# Patient Record
Sex: MALE | Race: WHITE | NOT HISPANIC OR LATINO | Employment: FULL TIME | URBAN - METROPOLITAN AREA
[De-identification: names, ages, dates, MRNs, and addresses within clinical notes are randomized per-mention and may not be internally consistent; named-entity substitution may affect disease eponyms.]

---

## 2022-05-06 ENCOUNTER — OFFICE VISIT (OUTPATIENT)
Dept: FAMILY MEDICINE | Facility: CLINIC | Age: 42
End: 2022-05-06
Payer: COMMERCIAL

## 2022-05-06 VITALS
RESPIRATION RATE: 18 BRPM | SYSTOLIC BLOOD PRESSURE: 156 MMHG | BODY MASS INDEX: 44.1 KG/M2 | WEIGHT: 315 LBS | DIASTOLIC BLOOD PRESSURE: 110 MMHG | OXYGEN SATURATION: 95 % | HEART RATE: 121 BPM | TEMPERATURE: 98.1 F | HEIGHT: 71 IN

## 2022-05-06 DIAGNOSIS — I10 HYPERTENSION GOAL BP (BLOOD PRESSURE) < 130/80: Primary | ICD-10-CM

## 2022-05-06 DIAGNOSIS — R49.0 HOARSENESS: ICD-10-CM

## 2022-05-06 DIAGNOSIS — Z13.29 SCREENING FOR THYROID DISORDER: ICD-10-CM

## 2022-05-06 DIAGNOSIS — Z71.6 ENCOUNTER FOR SMOKING CESSATION COUNSELING: ICD-10-CM

## 2022-05-06 DIAGNOSIS — R00.0 TACHYCARDIA: ICD-10-CM

## 2022-05-06 DIAGNOSIS — Z13.0 SCREENING FOR DEFICIENCY ANEMIA: ICD-10-CM

## 2022-05-06 PROBLEM — E66.9 OBESITY (BMI 30-39.9): Status: ACTIVE | Noted: 2022-05-06

## 2022-05-06 LAB
ERYTHROCYTE [DISTWIDTH] IN BLOOD BY AUTOMATED COUNT: 13.7 % (ref 10–15)
HCT VFR BLD AUTO: 40.9 % (ref 40–53)
HGB BLD-MCNC: 13.8 G/DL (ref 13.3–17.7)
MCH RBC QN AUTO: 30.4 PG (ref 26.5–33)
MCHC RBC AUTO-ENTMCNC: 33.7 G/DL (ref 31.5–36.5)
MCV RBC AUTO: 90 FL (ref 78–100)
PLATELET # BLD AUTO: 278 10E3/UL (ref 150–450)
RBC # BLD AUTO: 4.54 10E6/UL (ref 4.4–5.9)
WBC # BLD AUTO: 9 10E3/UL (ref 4–11)

## 2022-05-06 PROCEDURE — 85027 COMPLETE CBC AUTOMATED: CPT | Performed by: NURSE PRACTITIONER

## 2022-05-06 PROCEDURE — 36415 COLL VENOUS BLD VENIPUNCTURE: CPT | Performed by: NURSE PRACTITIONER

## 2022-05-06 PROCEDURE — 84443 ASSAY THYROID STIM HORMONE: CPT | Performed by: NURSE PRACTITIONER

## 2022-05-06 PROCEDURE — 82043 UR ALBUMIN QUANTITATIVE: CPT | Performed by: NURSE PRACTITIONER

## 2022-05-06 PROCEDURE — 99204 OFFICE O/P NEW MOD 45 MIN: CPT | Performed by: NURSE PRACTITIONER

## 2022-05-06 PROCEDURE — 93000 ELECTROCARDIOGRAM COMPLETE: CPT | Performed by: NURSE PRACTITIONER

## 2022-05-06 PROCEDURE — 80053 COMPREHEN METABOLIC PANEL: CPT | Performed by: NURSE PRACTITIONER

## 2022-05-06 RX ORDER — ALBUTEROL SULFATE 90 UG/1
2 AEROSOL, METERED RESPIRATORY (INHALATION) PRN
COMMUNITY
Start: 2021-09-13 | End: 2023-06-09

## 2022-05-06 RX ORDER — AMLODIPINE AND BENAZEPRIL HYDROCHLORIDE 5; 10 MG/1; MG/1
1 CAPSULE ORAL DAILY
Qty: 90 CAPSULE | Refills: 0 | Status: SHIPPED | OUTPATIENT
Start: 2022-05-06 | End: 2022-05-23

## 2022-05-06 RX ORDER — AMLODIPINE AND BENAZEPRIL HYDROCHLORIDE 5; 10 MG/1; MG/1
CAPSULE ORAL
COMMUNITY
Start: 2021-12-31 | End: 2022-05-06

## 2022-05-06 NOTE — LETTER
May 12, 2022      Julius Zuniga  [address] [place] [id number]        Dear ,    We are writing to inform you of your test results.    Your labs overall look good except for increase in microalbumin consistent with early damage to your kidneys from your elevated blood pressure.  We will keep you on your blood pressure medicine and increase if we need to. Quitting smoking will also be a huge help with this. We will discuss further at your upcoming office visit.     For additional lab test information, [email or web address] is an excellent reference.       Resulted Orders   CBC with platelets   Result Value Ref Range    WBC Count 9.0 4.0 - 11.0 10e3/uL    RBC Count 4.54 4.40 - 5.90 10e6/uL    Hemoglobin 13.8 13.3 - 17.7 g/dL    Hematocrit 40.9 40.0 - 53.0 %    MCV 90 78 - 100 fL    MCH 30.4 26.5 - 33.0 pg    MCHC 33.7 31.5 - 36.5 g/dL    RDW 13.7 10.0 - 15.0 %    Platelet Count 278 150 - 450 10e3/uL   Comprehensive metabolic panel (BMP + Alb, Alk Phos, ALT, AST, Total. Bili, TP)   Result Value Ref Range    Sodium 139 133 - 144 mmol/L    Potassium 4.2 3.4 - 5.3 mmol/L    Chloride 106 94 - 109 mmol/L    Carbon Dioxide (CO2) 30 20 - 32 mmol/L    Anion Gap 3 3 - 14 mmol/L    Urea Nitrogen 13 7 - 30 mg/dL    Creatinine 0.88 0.66 - 1.25 mg/dL    Calcium 9.2 8.5 - 10.1 mg/dL    Glucose 92 70 - 99 mg/dL    Alkaline Phosphatase 90 40 - 150 U/L    AST 20 0 - 45 U/L    ALT 38 0 - 70 U/L    Protein Total 7.4 6.8 - 8.8 g/dL    Albumin 3.8 3.4 - 5.0 g/dL    Bilirubin Total 0.2 0.2 - 1.3 mg/dL    GFR Estimate >90 >60 mL/min/1.73m2      Comment:      Effective December 21, 2021 eGFRcr in adults is calculated using the 2021 CKD-EPI creatinine equation which includes age and gender (Leelee carver al., NEJM, DOI: 10.1056/VVXMsf3507655)   Albumin Random Urine Quantitative with Creat Ratio   Result Value Ref Range    Creatinine Urine mg/dL 147 mg/dL    Albumin Urine mg/L 34 mg/L    Albumin Urine mg/g Cr 23.13 (H) 0.00 - 17.00  mg/g Cr   TSH with free T4 reflex   Result Value Ref Range    TSH 1.52 0.40 - 4.00 mU/L       If you have any questions or concerns, please call the clinic at the number listed above.       Sincerely,        LOYD Mayes-BC /

## 2022-05-06 NOTE — PROGRESS NOTES
"Assessment & Plan     Hypertension goal BP (blood pressure) < 130/80  Needs recheck of BP within 2 weeks of med restart.   Continue same medication this was refilled today. IF not good control will switch to lisinopril.   Weight loss.   Activity.  Smoking cessation.   Physical soon with fasting cholesterol.   Julius verbalizes understanding of plan of care and is in agreement.   - Comprehensive metabolic panel (BMP + Alb, Alk Phos, ALT, AST, Total. Bili, TP)  - Albumin Random Urine Quantitative with Creat Ratio  - Comprehensive metabolic panel (BMP + Alb, Alk Phos, ALT, AST, Total. Bili, TP)  - Albumin Random Urine Quantitative with Creat Ratio    BMI 45.0-49.9, adult (H)  Work on healthier eating increased activity and goal of weight loss.   Closely monitor.     Encounter for smoking cessation counseling  Chantix.  - varenicline (CHANTIX ISAAC) 0.5 MG X 11 & 1 MG X 42 tablet  Dispense: 53 tablet; Refill: 0    Hoarseness  Chronic referral to ENT.  - Otolaryngology Referral    Tachycardia  EKG sinus tachy; Labs avoid caffeine. Consider Zio monitor or Echo.  - EKG 12-lead complete w/read - Clinics    Screening for thyroid disorder    - TSH with free T4 reflex  - TSH with free T4 reflex    Screening for deficiency anemia    - CBC with platelets  - CBC with platelets    Tobacco Cessation:   reports that he has been smoking cigarettes. He started smoking about 26 years ago. He has a 26.00 pack-year smoking history. He has never used smokeless tobacco.  Tobacco Cessation Action Plan: Pharmacotherapies : Chantix    BMI:   Estimated body mass index is 49.3 kg/m  as calculated from the following:    Height as of this encounter: 1.802 m (5' 10.95\").    Weight as of this encounter: 160.1 kg (352 lb 14.4 oz).   Weight management plan: Discussed healthy diet and exercise guidelines    Return in about 2 weeks (around 5/20/2022) for nurse only BP check; wellnessl with fasting labs in 4-8 weeks.      Jennifer Walker, KRISTIN CNP  M " "[facility] [place]    Facundo Madrid is a 42 year old who presents for the following health issues     History of Present Illness       Hypertension: He presents for follow up of hypertension.  He does not check blood pressure  regularly outside of the clinic. Outside blood pressures have been over 140/90. He follows a low salt diet.     He eats 2-3 servings of fruits and vegetables daily.He consumes 1 sweetened beverage(s) daily.He exercises with enough effort to increase his heart rate 20 to 29 minutes per day.  He exercises with enough effort to increase his heart rate 4 days per week.   He is taking medications regularly.     New patient. Changing health system. Needs labs physical BP check   HTN not under good control out of med.   .   Increased weight long hours sitting increased processed foods.  Wishes to quit smoking.   Chronic hoarseness of throat.    Wt Readings from Last 5 Encounters:   05/06/22 (!) 160.1 kg (352 lb 14.4 oz)     Review of Systems   Constitutional, HEENT, cardiovascular, pulmonary, GI, , musculoskeletal, neuro, skin, endocrine and psych systems are negative, except as otherwise noted in the HPI.      Objective    BP (!) 156/110   Pulse (!) 121   Temp 98.1  F (36.7  C) (Tympanic)   Resp 18   Ht 1.802 m (5' 10.95\")   Wt (!) 160.1 kg (352 lb 14.4 oz)   SpO2 95%   BMI 49.30 kg/m    Body mass index is 49.3 kg/m .  Physical Exam   GENERAL: healthy, alert and no distress  RESP: lungs clear to auscultation - no rales, rhonchi or wheezes  CV: mildly tachy, normal rhythm, normal S1 S2, no S3 or S4, no murmur, click or rub, no peripheral edema and peripheral pulses strong  NEURO: Normal strength and tone, mentation intact and speech normal  PSYCH: mentation appears normal, affect normal/bright    Results for orders placed or performed in visit on 05/06/22   CBC with platelets     Status: Normal   Result Value Ref Range    WBC Count 9.0 4.0 - 11.0 10e3/uL    " RBC Count 4.54 4.40 - 5.90 10e6/uL    Hemoglobin 13.8 13.3 - 17.7 g/dL    Hematocrit 40.9 40.0 - 53.0 %    MCV 90 78 - 100 fL    MCH 30.4 26.5 - 33.0 pg    MCHC 33.7 31.5 - 36.5 g/dL    RDW 13.7 10.0 - 15.0 %    Platelet Count 278 150 - 450 10e3/uL   Comprehensive metabolic panel (BMP + Alb, Alk Phos, ALT, AST, Total. Bili, TP)     Status: Normal   Result Value Ref Range    Sodium 139 133 - 144 mmol/L    Potassium 4.2 3.4 - 5.3 mmol/L    Chloride 106 94 - 109 mmol/L    Carbon Dioxide (CO2) 30 20 - 32 mmol/L    Anion Gap 3 3 - 14 mmol/L    Urea Nitrogen 13 7 - 30 mg/dL    Creatinine 0.88 0.66 - 1.25 mg/dL    Calcium 9.2 8.5 - 10.1 mg/dL    Glucose 92 70 - 99 mg/dL    Alkaline Phosphatase 90 40 - 150 U/L    AST 20 0 - 45 U/L    ALT 38 0 - 70 U/L    Protein Total 7.4 6.8 - 8.8 g/dL    Albumin 3.8 3.4 - 5.0 g/dL    Bilirubin Total 0.2 0.2 - 1.3 mg/dL    GFR Estimate >90 >60 mL/min/1.73m2   Albumin Random Urine Quantitative with Creat Ratio     Status: Abnormal   Result Value Ref Range    Creatinine Urine mg/dL 147 mg/dL    Albumin Urine mg/L 34 mg/L    Albumin Urine mg/g Cr 23.13 (H) 0.00 - 17.00 mg/g Cr   TSH with free T4 reflex     Status: Normal   Result Value Ref Range    TSH 1.52 0.40 - 4.00 mU/L

## 2022-05-09 LAB
ALBUMIN SERPL-MCNC: 3.8 G/DL (ref 3.4–5)
ALP SERPL-CCNC: 90 U/L (ref 40–150)
ALT SERPL W P-5'-P-CCNC: 38 U/L (ref 0–70)
ANION GAP SERPL CALCULATED.3IONS-SCNC: 3 MMOL/L (ref 3–14)
AST SERPL W P-5'-P-CCNC: 20 U/L (ref 0–45)
BILIRUB SERPL-MCNC: 0.2 MG/DL (ref 0.2–1.3)
BUN SERPL-MCNC: 13 MG/DL (ref 7–30)
CALCIUM SERPL-MCNC: 9.2 MG/DL (ref 8.5–10.1)
CHLORIDE BLD-SCNC: 106 MMOL/L (ref 94–109)
CO2 SERPL-SCNC: 30 MMOL/L (ref 20–32)
CREAT SERPL-MCNC: 0.88 MG/DL (ref 0.66–1.25)
CREAT UR-MCNC: 147 MG/DL
GFR SERPL CREATININE-BSD FRML MDRD: >90 ML/MIN/1.73M2
GLUCOSE BLD-MCNC: 92 MG/DL (ref 70–99)
MICROALBUMIN UR-MCNC: 34 MG/L
MICROALBUMIN/CREAT UR: 23.13 MG/G CR (ref 0–17)
POTASSIUM BLD-SCNC: 4.2 MMOL/L (ref 3.4–5.3)
PROT SERPL-MCNC: 7.4 G/DL (ref 6.8–8.8)
SODIUM SERPL-SCNC: 139 MMOL/L (ref 133–144)
TSH SERPL DL<=0.005 MIU/L-ACNC: 1.52 MU/L (ref 0.4–4)

## 2022-05-10 NOTE — RESULT ENCOUNTER NOTE
Note to Staff: please send letter    Julius,    Your labs overall look good except for increase in microalbumin consistent with early damage to your kidneys from your elevated blood pressure.  We will keep you on your blood pressure medicine and increase if we need to. Quitting smoking will also be a huge help with this. We will discuss further at your upcoming office visit.    For additional lab test information, [email or web address] is an excellent reference.    Thank you for choosing [facility].  It was an honor and a privilege to participate in your care.     Healthy regards,     Jennifer Walker, FNP-BC

## 2022-05-20 ENCOUNTER — ALLIED HEALTH/NURSE VISIT (OUTPATIENT)
Dept: NURSING | Facility: CLINIC | Age: 42
End: 2022-05-20
Payer: COMMERCIAL

## 2022-05-20 VITALS
HEART RATE: 103 BPM | OXYGEN SATURATION: 98 % | DIASTOLIC BLOOD PRESSURE: 108 MMHG | RESPIRATION RATE: 20 BRPM | SYSTOLIC BLOOD PRESSURE: 155 MMHG

## 2022-05-20 DIAGNOSIS — E66.9 OBESITY (BMI 30-39.9): ICD-10-CM

## 2022-05-20 DIAGNOSIS — R03.0 ELEVATED BLOOD PRESSURE READING WITHOUT DIAGNOSIS OF HYPERTENSION: Primary | ICD-10-CM

## 2022-05-20 PROCEDURE — 99207 PR NO CHARGE NURSE ONLY: CPT

## 2022-05-20 NOTE — PROGRESS NOTES
Julius Zuniga is being followed for Blood Pressure management.    Pt noted unsure if taken meds today. Pt reports left ankle swelling, writer noted LLE ankle to be swollen, non-red or warm or painful. Pt reports swelling since medication start. Pt also reporting polydipsia, polyuria, and leg cramping. Pt denies diabetes, fam hx of diabetes.     Pt advised to recheck BP next week, Pt stated he is a  and is on road M-F but able to come back in next Friday.     BP Readings from Last 3 Encounters:   05/20/22 (!) 155/108   05/06/22 (!) 156/110   06/23/10 120/66       Wt Readings from Last 2 Encounters:   05/06/22 (!) 160.1 kg (352 lb 14.4 oz)       Is pulse 55 or greater? - Yes    Pulse Readings from Last 3 Encounters:   05/20/22 103   05/06/22 (!) 121   06/23/10 96       Current blood pressure medication(s):  Current Outpatient Medications   Medication Sig Dispense Refill     albuterol (PROAIR HFA/PROVENTIL HFA/VENTOLIN HFA) 108 (90 Base) MCG/ACT inhaler Inhale 2 puffs into the lungs as needed       amLODIPine-benazepril (LOTREL) 5-10 MG capsule Take 1 capsule by mouth daily 90 capsule 0     TYLENOL 325 MG PO TABS 2 TABLETS EVERY 4 HOURS AS NEEDED       varenicline (CHANTIX ISAAC) 0.5 MG X 11 & 1 MG X 42 tablet Take 0.5 mg tab daily for 3 days, THEN 0.5 mg tab twice daily for 4 days, THEN 1 mg twice daily. 53 tablet 0          1. Follow up instructions include:     Next Provider visit: Follow up in 3 months..      SUBJECTIVE:                                                    The patient is taking medication as prescribed and is tolerating well.  Pt noted he was unsure if he had taken his medication today.   Patient is not monitoring Blood Pressure at home.            Out of the following complicating factors: Cough, Headache, Lightheadedness, Shortness of breath, Fatigue, Nausea, Sexual Dysfunction, New onset of swelling or edema, Weakness and New onset of Chest Pain, the patient reports:   None    OBJECTIVE:                                                      Today's BP completed using cuff size: large on left side  arm.      Potassium   Date Value Ref Range Status   05/06/2022 4.2 3.4 - 5.3 mmol/L Final     Creatinine   Date Value Ref Range Status   05/06/2022 0.88 0.66 - 1.25 mg/dL Final     Urea Nitrogen   Date Value Ref Range Status   05/06/2022 13 7 - 30 mg/dL Final     GFR Estimate   Date Value Ref Range Status   05/06/2022 >90 >60 mL/min/1.73m2 Final     Comment:     Effective December 21, 2021 eGFRcr in adults is calculated using the 2021 CKD-EPI creatinine equation which includes age and gender (Leelee et al., NEJM, DOI: 10.1056/QRCZjd2191657)         Education:  general discussion/verbal explanation  Ways to help improve BP/HTN:   Medications  Lose weight  Diet low in fat and rich in fruits, vegetables and low fat dairy products  Reduce salt in diet  Do something active for at least 30 minutes a day on most days of the week  Cut down on alcohol (if you drink more than 2 drinks per day)  Decrease stress (exercise, read, yoga, meditation, time for self, etc.)   Patient was given an opportunity to ask questions.    Patient verbalized understanding of this plan and is agreeable.    Favian Renteria RN

## 2022-05-23 RX ORDER — HYDROCHLOROTHIAZIDE 12.5 MG/1
12.5 TABLET ORAL DAILY
Qty: 90 TABLET | Refills: 0 | Status: SHIPPED | OUTPATIENT
Start: 2022-05-23 | End: 2022-08-24

## 2022-05-23 RX ORDER — LISINOPRIL 20 MG/1
20 TABLET ORAL DAILY
Qty: 90 TABLET | Refills: 0 | Status: SHIPPED | OUTPATIENT
Start: 2022-05-23 | End: 2022-07-01 | Stop reason: ALTCHOICE

## 2022-05-23 NOTE — PROGRESS NOTES
BP is not under great control at all.  I would like to switch his current medication to get him off of LOTREL( his Medication he has been in the past).    Please have him discontinue Lotrel.  Will start lisinopril 20 mg once daily as well as hydrochlorothiazide 12.5 daily.  He can absolutely take these together and eventually we can get him a combo pill once we know his dosage.  This will help swelling.  Recheck blood pressure next in 2 weeks WITH BMP labs.  Please reschedule his nurse only blood pressure check so he has more time for this to work.      The other option which would be better would be a fasting physical with me in 2 weeks we can check his cholesterol and also do his blood pressure and BMP lab.     BP Readings from Last 3 Encounters:   05/20/22 (!) 155/108   05/06/22 (!) 156/110   06/23/10 120/66         Jennifer Walker, FNP-BC

## 2022-05-23 NOTE — PROGRESS NOTES
Called [phone]    Informed patient of plan. Patient verbalized understanding. Patient is  Sunday - Friday. Gets home Friday sometime. Scheduled nurse visit in 2 weeks.     He tried to  the chantix - it was too expensive - $430 - is there an alternative?    Routing to provider to review and advise.    Loreto Lindsay RN  [facility]

## 2022-05-28 RX ORDER — VARENICLINE TARTRATE 0.5 MG/1
TABLET, FILM COATED ORAL
Qty: 53 TABLET | Refills: 0 | Status: SHIPPED | OUTPATIENT
Start: 2022-05-28 | End: 2022-06-10

## 2022-06-10 ENCOUNTER — ALLIED HEALTH/NURSE VISIT (OUTPATIENT)
Dept: NURSING | Facility: CLINIC | Age: 42
End: 2022-06-10
Payer: COMMERCIAL

## 2022-06-10 ENCOUNTER — LAB (OUTPATIENT)
Dept: LAB | Facility: CLINIC | Age: 42
End: 2022-06-10

## 2022-06-10 VITALS
WEIGHT: 315 LBS | BODY MASS INDEX: 50.15 KG/M2 | OXYGEN SATURATION: 94 % | DIASTOLIC BLOOD PRESSURE: 94 MMHG | SYSTOLIC BLOOD PRESSURE: 136 MMHG | RESPIRATION RATE: 18 BRPM | HEART RATE: 97 BPM

## 2022-06-10 DIAGNOSIS — Z71.6 ENCOUNTER FOR SMOKING CESSATION COUNSELING: ICD-10-CM

## 2022-06-10 DIAGNOSIS — R03.0 ELEVATED BLOOD PRESSURE READING WITHOUT DIAGNOSIS OF HYPERTENSION: ICD-10-CM

## 2022-06-10 DIAGNOSIS — E66.9 OBESITY (BMI 30-39.9): ICD-10-CM

## 2022-06-10 DIAGNOSIS — I10 HYPERTENSION GOAL BP (BLOOD PRESSURE) < 130/80: Primary | ICD-10-CM

## 2022-06-10 PROCEDURE — 99207 PR NO CHARGE NURSE ONLY: CPT

## 2022-06-10 PROCEDURE — 80048 BASIC METABOLIC PNL TOTAL CA: CPT

## 2022-06-10 PROCEDURE — 36415 COLL VENOUS BLD VENIPUNCTURE: CPT

## 2022-06-10 RX ORDER — BUPROPION HYDROCHLORIDE 150 MG/1
150 TABLET, FILM COATED, EXTENDED RELEASE ORAL 2 TIMES DAILY
Qty: 90 TABLET | Refills: 1 | Status: CANCELLED | OUTPATIENT
Start: 2022-06-10

## 2022-06-10 NOTE — LETTER
[facility]  [address] [address]  [phone]                    June 13, 2022    Julius Zuniga  [address] [address]      Dear Julius,    Here is a summary of your recent test results:    Kidney function is normal (Cr, GFR), Sodium is normal, Potassium is normal, Calcium is normal, Glucose is normal for a non-fasting specimen. Your test results are enclosed.  Please contact me if you have any questions.    In addition, here is a list of due or overdue Health Maintenance reminders.    Health Maintenance Due   Topic Date Due     Preventive Care Visit  Never done     ANNUAL REVIEW OF HM ORDERS  Never done     Discuss Advance Care Planning  Never done     COVID-19 Vaccine (1) Never done     Pneumococcal Vaccine (1 - PCV) Never done     HIV Screening  Never done     Hepatitis C Screening  Never done     Cholesterol Lab  Never done     Diptheria Tetanus Pertussis (DTAP/TDAP/TD) Vaccine (3 - Td or Tdap) 11/08/2019   Please call us at [phone] (or use [email or web address]) to address the above recommendations. Thank you very much for trusting [facility].     Healthy regards,      Elin Jackson PA-C         Results for orders placed or performed in visit on 06/10/22   Basic metabolic panel  (Ca, Cl, CO2, Creat, Gluc, K, Na, BUN)     Status: Abnormal   Result Value Ref Range    Sodium 139 133 - 144 mmol/L    Potassium 4.1 3.4 - 5.3 mmol/L    Chloride 104 94 - 109 mmol/L    Carbon Dioxide (CO2) 31 20 - 32 mmol/L    Anion Gap 4 3 - 14 mmol/L    Urea Nitrogen 12 7 - 30 mg/dL    Creatinine 0.86 0.66 - 1.25 mg/dL    Calcium 9.4 8.5 - 10.1 mg/dL    Glucose 109 (H) 70 - 99 mg/dL    GFR Estimate >90 >60 mL/min/1.73m2

## 2022-06-10 NOTE — PROGRESS NOTES
Julius Zuniga is being followed for Blood Pressure management.      Pt presented for BP recheck after change in medication. Pt reports swelling has decreased in LE significantly, writer assessed and did appear less than prior. Pt BP was better today but still not at goal. Pt HR was tachy initially on first check, did reduce slightly on second assessment. Advised will discuss with PCP/provider adjustment of medication and follow-up, will call when known. Patient stated an understanding and agreed with plan.    Pt had second question about smoking cessation medication. Pt reports a friend of his used Zyban to help. Pt wants to consider this medication. Writer advised will discuss this as well with PCP/provider.     BP Readings from Last 3 Encounters:   06/10/22 (!) 136/94   05/20/22 (!) 155/108   05/06/22 (!) 156/110       Wt Readings from Last 2 Encounters:   06/10/22 (!) 162.8 kg (359 lb)   05/06/22 (!) 160.1 kg (352 lb 14.4 oz)       Is pulse 55 or greater? - Yes    Pulse Readings from Last 3 Encounters:   06/10/22 97   05/20/22 103   05/06/22 (!) 121       Current blood pressure medication(s):  Current Outpatient Medications   Medication Sig Dispense Refill     albuterol (PROAIR HFA/PROVENTIL HFA/VENTOLIN HFA) 108 (90 Base) MCG/ACT inhaler Inhale 2 puffs into the lungs as needed       hydrochlorothiazide (HYDRODIURIL) 12.5 MG tablet Take 1 tablet (12.5 mg) by mouth daily DISCONTINUE LOTREL 90 tablet 0     lisinopril (ZESTRIL) 20 MG tablet Take 1 tablet (20 mg) by mouth daily DISCONTINUE LOTREL 90 tablet 0     TYLENOL 325 MG PO TABS 2 TABLETS EVERY 4 HOURS AS NEEDED       varenicline (CHANTIX) 0.5 MG tablet Take 0.5 mg tab daily for 3 days, THEN 0.5 mg tab twice daily for 4 days, THEN 1 mg twice daily. 53 tablet 0          1. Follow up instructions include:     Next Provider visit: Follow up in 3 months..      SUBJECTIVE:                                                    The patient is taking medication as  prescribed and is tolerating well.   Patient is not monitoring Blood Pressure at home.       Per Last BP check: Please have him discontinue Lotrel.  Will start lisinopril 20 mg once daily as well as hydrochlorothiazide 12.5 daily.  He can absolutely take these together and eventually we can get him a combo pill once we know his dosage.  This will help swelling.  Recheck blood pressure next in 2 weeks WITH BMP labs.  Please reschedule his nurse only blood pressure check so he has more time for this to work.         Out of the following complicating factors: Cough, Headache, Lightheadedness, Shortness of breath, Fatigue, Nausea, Sexual Dysfunction, New onset of swelling or edema, Weakness and New onset of Chest Pain, the patient reports:  None    OBJECTIVE:                                                      Today's BP completed using cuff size: large on left side  arm.      Potassium   Date Value Ref Range Status   05/06/2022 4.2 3.4 - 5.3 mmol/L Final     Creatinine   Date Value Ref Range Status   05/06/2022 0.88 0.66 - 1.25 mg/dL Final     Urea Nitrogen   Date Value Ref Range Status   05/06/2022 13 7 - 30 mg/dL Final     GFR Estimate   Date Value Ref Range Status   05/06/2022 >90 >60 mL/min/1.73m2 Final     Comment:     Effective December 21, 2021 eGFRcr in adults is calculated using the 2021 CKD-EPI creatinine equation which includes age and gender (Leelee carver al., NEJM, DOI: 10.1056/HQYNvq9763871)         Education:  general discussion/verbal explanation  Ways to help improve BP/HTN:   Medications  Lose weight  Diet low in fat and rich in fruits, vegetables and low fat dairy products  Reduce salt in diet  Do something active for at least 30 minutes a day on most days of the week  Cut down on alcohol (if you drink more than 2 drinks per day)  Decrease stress (exercise, read, yoga, meditation, time for self, etc.)   Patient was given an opportunity to ask questions.    Patient verbalized understanding of this plan  and is agreeable.    Favian Renteria RN

## 2022-06-11 LAB
ANION GAP SERPL CALCULATED.3IONS-SCNC: 4 MMOL/L (ref 3–14)
BUN SERPL-MCNC: 12 MG/DL (ref 7–30)
CALCIUM SERPL-MCNC: 9.4 MG/DL (ref 8.5–10.1)
CHLORIDE BLD-SCNC: 104 MMOL/L (ref 94–109)
CO2 SERPL-SCNC: 31 MMOL/L (ref 20–32)
CREAT SERPL-MCNC: 0.86 MG/DL (ref 0.66–1.25)
GFR SERPL CREATININE-BSD FRML MDRD: >90 ML/MIN/1.73M2
GLUCOSE BLD-MCNC: 109 MG/DL (ref 70–99)
POTASSIUM BLD-SCNC: 4.1 MMOL/L (ref 3.4–5.3)
SODIUM SERPL-SCNC: 139 MMOL/L (ref 133–144)

## 2022-06-11 NOTE — RESULT ENCOUNTER NOTE
I have reviewed your recent labs. Here are the results:    -Kidney function is normal (Cr, GFR), Sodium is normal, Potassium is normal, Calcium is normal, Glucose is normal for a non-fasting specimen.      If you have any questions please do not hesitate to contact our office via phone ([phone]) or MyChart.    Elin Jackson MBA, MS, PA-C (covering for Jennifer Walker CNP)  [facility]

## 2022-06-15 RX ORDER — LISINOPRIL 30 MG/1
30 TABLET ORAL DAILY
Qty: 90 TABLET | Refills: 3 | Status: SHIPPED | OUTPATIENT
Start: 2022-06-15 | End: 2023-06-27

## 2022-06-15 RX ORDER — BUPROPION HYDROCHLORIDE 150 MG/1
150 TABLET, FILM COATED, EXTENDED RELEASE ORAL 2 TIMES DAILY
Qty: 180 TABLET | Refills: 1 | Status: SHIPPED | OUTPATIENT
Start: 2022-06-15 | End: 2023-07-16

## 2022-06-15 NOTE — PROGRESS NOTES
Chantix must not been getting covered by insurance?    We had discussed smoking cessation in clinic so okay to start Wellbutrin as long as he reports no previous history of any seizures please confirm this and route back to me.    Please describe below:  Smoking cessation: Note: May be used as monotherapy or in combination with nicotine replacement therapy (Ashley 2015): 12-hour extended release (sustained release): Oral: Initial: 150 mg once daily for 3 days; increase to 150 mg twice daily (maximum dose: 300 mg/day). For patients who do not tolerate titration to the full dose, consider continuing 150 mg once daily; the lower dose has shown efficacy (Erica 2014).  Note: Therapy should begin at least 1 week before target quit date. Target quit dates are generally in the second week of treatment. If patient successfully quits smoking, continue treatment for at least 12 weeks. May consider maintenance therapy based on individual patient risk:benefit; evidence suggests relapse prevention benefits with continuing therapy for up to 1 year. Conversely, if significant progress has not been made by the seventh week of therapy, success is unlikely; consider combination therapy, or discontinuation and use of an alternative agent (Moreno 2020;  s labeling).    As far as his blood pressure he is not at goal; needs to increase his lisinopril to 30 mg once daily. I will send a new prescription but he can take one and a half of his current dose until this is picked up.    Needs a nurse only blood pressure check in 2 weeks with repeat BMP.      Jennifer Walker, FNP-BC

## 2022-06-15 NOTE — PROGRESS NOTES
Called # [phone]     Pt called, noted he has no hx of seizures. Stated understanding of medication adjustment, scheduled future visits.   Future Appointments   Date Time Provider Department Center   7/1/2022  2:45 PM RV LAB RVLABR RV   7/1/2022  4:00 PM RV RN VISITS RVNUR RV         Favian Renteria, COMPA   [facility]

## 2022-07-01 ENCOUNTER — ALLIED HEALTH/NURSE VISIT (OUTPATIENT)
Dept: NURSING | Facility: CLINIC | Age: 42
End: 2022-07-01
Payer: COMMERCIAL

## 2022-07-01 ENCOUNTER — LAB (OUTPATIENT)
Dept: LAB | Facility: CLINIC | Age: 42
End: 2022-07-01

## 2022-07-01 VITALS
BODY MASS INDEX: 50.19 KG/M2 | SYSTOLIC BLOOD PRESSURE: 132 MMHG | WEIGHT: 315 LBS | OXYGEN SATURATION: 95 % | RESPIRATION RATE: 18 BRPM | DIASTOLIC BLOOD PRESSURE: 78 MMHG | HEART RATE: 102 BPM

## 2022-07-01 DIAGNOSIS — I10 HYPERTENSION GOAL BP (BLOOD PRESSURE) < 130/80: ICD-10-CM

## 2022-07-01 DIAGNOSIS — Z01.30 BP CHECK: ICD-10-CM

## 2022-07-01 DIAGNOSIS — R03.0 ELEVATED BLOOD PRESSURE READING WITHOUT DIAGNOSIS OF HYPERTENSION: Primary | ICD-10-CM

## 2022-07-01 PROCEDURE — 99207 PR NO CHARGE NURSE ONLY: CPT

## 2022-07-01 PROCEDURE — 36415 COLL VENOUS BLD VENIPUNCTURE: CPT

## 2022-07-01 PROCEDURE — 80048 BASIC METABOLIC PNL TOTAL CA: CPT

## 2022-07-01 NOTE — PROGRESS NOTES
Julius Zuniga is being followed for Blood Pressure management.      BP Readings from Last 3 Encounters:   07/01/22 132/78   06/10/22 (!) 136/94   05/20/22 (!) 155/108       Wt Readings from Last 2 Encounters:   07/01/22 (!) 163 kg (359 lb 4.8 oz)   06/10/22 (!) 162.8 kg (359 lb)       Is pulse 55 or greater? - Yes    Pulse Readings from Last 3 Encounters:   07/01/22 102   06/10/22 97   05/20/22 103       Current blood pressure medication(s):  Current Outpatient Medications   Medication Sig Dispense Refill     albuterol (PROAIR HFA/PROVENTIL HFA/VENTOLIN HFA) 108 (90 Base) MCG/ACT inhaler Inhale 2 puffs into the lungs as needed       buPROPion (ZYBAN) 150 MG 12 hr tablet Take 1 tablet (150 mg) by mouth 2 times daily 150 mg once daily for 3 days; increase to 150 mg twice daily 180 tablet 1     hydrochlorothiazide (HYDRODIURIL) 12.5 MG tablet Take 1 tablet (12.5 mg) by mouth daily DISCONTINUE LOTREL 90 tablet 0     lisinopril (ZESTRIL) 30 MG tablet Take 1 tablet (30 mg) by mouth daily 90 tablet 3     TYLENOL 325 MG PO TABS 2 TABLETS EVERY 4 HOURS AS NEEDED            1. Follow up instructions include:     Next Provider visit: Follow up in 3 months..      SUBJECTIVE:                                                    The patient is taking medication as prescribed and is tolerating well.   Patient is not monitoring Blood Pressure at home.       Per Last BP encounter:    As far as his blood pressure he is not at goal; needs to increase his lisinopril to 30 mg once daily. I will send a new prescription but he can take one and a half of his current dose until this is picked up.    Out of the following complicating factors: Cough, Headache, Lightheadedness, Shortness of breath, Fatigue, Nausea, Sexual Dysfunction, New onset of swelling or edema, Weakness and New onset of Chest Pain, the patient reports:  None    OBJECTIVE:                                                      Today's BP completed using cuff size: large on  right side arm.      Potassium   Date Value Ref Range Status   06/10/2022 4.1 3.4 - 5.3 mmol/L Final     Creatinine   Date Value Ref Range Status   06/10/2022 0.86 0.66 - 1.25 mg/dL Final     Urea Nitrogen   Date Value Ref Range Status   06/10/2022 12 7 - 30 mg/dL Final     GFR Estimate   Date Value Ref Range Status   06/10/2022 >90 >60 mL/min/1.73m2 Final     Comment:     Effective December 21, 2021 eGFRcr in adults is calculated using the 2021 CKD-EPI creatinine equation which includes age and gender (Leelee et al., NEJM, DOI: 10.1056/EAZRwn5178811)         Education:  general discussion/verbal explanation  Ways to help improve BP/HTN:   Medications  Lose weight  Diet low in fat and rich in fruits, vegetables and low fat dairy products  Reduce salt in diet  Do something active for at least 30 minutes a day on most days of the week  Cut down on alcohol (if you drink more than 2 drinks per day)  Decrease stress (exercise, read, yoga, meditation, time for self, etc.)   Patient was given an opportunity to ask questions.    Patient verbalized understanding of this plan and is agreeable.    Favian Renteria RN

## 2022-07-03 LAB
ANION GAP SERPL CALCULATED.3IONS-SCNC: 8 MMOL/L (ref 3–14)
BUN SERPL-MCNC: 14 MG/DL (ref 7–30)
CALCIUM SERPL-MCNC: 9 MG/DL (ref 8.5–10.1)
CHLORIDE BLD-SCNC: 105 MMOL/L (ref 94–109)
CO2 SERPL-SCNC: 25 MMOL/L (ref 20–32)
CREAT SERPL-MCNC: 0.74 MG/DL (ref 0.66–1.25)
GFR SERPL CREATININE-BSD FRML MDRD: >90 ML/MIN/1.73M2
GLUCOSE BLD-MCNC: 125 MG/DL (ref 70–99)
POTASSIUM BLD-SCNC: 4.1 MMOL/L (ref 3.4–5.3)
SODIUM SERPL-SCNC: 138 MMOL/L (ref 133–144)

## 2023-01-16 ENCOUNTER — OFFICE VISIT (OUTPATIENT)
Dept: FAMILY MEDICINE | Facility: CLINIC | Age: 43
End: 2023-01-16
Payer: COMMERCIAL

## 2023-01-16 VITALS
BODY MASS INDEX: 44.1 KG/M2 | WEIGHT: 315 LBS | RESPIRATION RATE: 16 BRPM | DIASTOLIC BLOOD PRESSURE: 110 MMHG | HEIGHT: 71 IN | OXYGEN SATURATION: 95 % | HEART RATE: 100 BPM | SYSTOLIC BLOOD PRESSURE: 164 MMHG | TEMPERATURE: 97.8 F

## 2023-01-16 DIAGNOSIS — M54.50 CHRONIC BILATERAL LOW BACK PAIN WITHOUT SCIATICA: Primary | ICD-10-CM

## 2023-01-16 DIAGNOSIS — I10 HYPERTENSION GOAL BP (BLOOD PRESSURE) < 130/80: ICD-10-CM

## 2023-01-16 DIAGNOSIS — G89.29 CHRONIC BILATERAL LOW BACK PAIN WITHOUT SCIATICA: Primary | ICD-10-CM

## 2023-01-16 DIAGNOSIS — R05.1 ACUTE COUGH: ICD-10-CM

## 2023-01-16 PROCEDURE — 99214 OFFICE O/P EST MOD 30 MIN: CPT | Performed by: PHYSICIAN ASSISTANT

## 2023-01-16 RX ORDER — BENZONATATE 200 MG/1
200 CAPSULE ORAL 3 TIMES DAILY PRN
Qty: 30 CAPSULE | Refills: 0 | Status: SHIPPED | OUTPATIENT
Start: 2023-01-16 | End: 2023-07-16

## 2023-01-16 RX ORDER — METHOCARBAMOL 500 MG/1
500 TABLET, FILM COATED ORAL 3 TIMES DAILY PRN
Qty: 30 TABLET | Refills: 0 | Status: SHIPPED | OUTPATIENT
Start: 2023-01-16 | End: 2023-07-16

## 2023-01-16 ASSESSMENT — PAIN SCALES - GENERAL: PAINLEVEL: EXTREME PAIN (8)

## 2023-01-16 NOTE — PROGRESS NOTES
"  Assessment & Plan     Chronic bilateral low back pain without sciatica    Likely muscular since exam is normal. Use muscle relaxer and heat as needed. Discussed that PT would be the next step if not improving.    - methocarbamol (ROBAXIN) 500 MG tablet; Take 1 tablet (500 mg) by mouth 3 times daily as needed for muscle spasms      Acute cough    Treat with tessalon perles and over the counter cough medication as needed. Likely viral. Lungs clear.    - benzonatate (TESSALON) 200 MG capsule; Take 1 capsule (200 mg) by mouth 3 times daily as needed for cough      Hypertension goal BP < 130/80    Not well controlled at visit today. He states he took his medication just before coming to clinic. Continue to monitor.             BMI:   Estimated body mass index is 50.21 kg/m  as calculated from the following:    Height as of this encounter: 1.803 m (5' 11\").    Weight as of this encounter: 163.3 kg (360 lb).   Weight management plan: Discussed healthy diet and exercise guidelines        No follow-ups on file.    ANTHONY Guerra [facility]    Facundo Madrid is a 42 year old, presenting for the following health issues:  Back Pain and Cough      History of Present Illness       Back Pain:  He presents for follow up of back pain. Patient's back pain is a recurring problem.  Location of back pain:  Right lower back and left lower back  Description of back pain: burning  Back pain spreads: right buttocks, left buttocks, right thigh and left thigh    Since patient first noticed back pain, pain is: always present, but gets better and worse  Does back pain interfere with his job:  Not applicable      He eats 0-1 servings of fruits and vegetables daily.He consumes 2 sweetened beverage(s) daily.He exercises with enough effort to increase his heart rate 20 to 29 minutes per day.  He exercises with enough effort to increase his heart rate 7 days per week.   He is taking medications regularly.   " "    Pain History:  When did you first notice your pain? - 4-5 months ago- comes and goes  Have you seen anyone else for your pain? No  Where in your body do you have pain? Back Pain  Onset/Duration: 4-5 months  Description:   Location of pain: low back - all across  Character of pain: burning  Pain radiation: radiates into the right buttocks, radiates into the right leg, radiates into the left buttocks and radiates into the left leg  New numbness or weakness in legs, not attributed to pain: no   Intensity: depends on how long he is standing  Progression of Symptoms: intermittent  History:   Specific cause: none  Pain interferes with job:  no   History of back problems: no prior back problems  Any previous MRI or X-rays: None  Sees a specialist for back pain: No  Alleviating factors:   Improved by: none    Precipitating factors:  Worsened by: Standing and Walking  Therapies tried and outcome: chiropractor, cold and NSAIDs- not helpful    Accompanying Signs & Symptoms:  Risk of Fracture: None  Risk of Cauda Equina: None  Risk of Infection: None  Risk of Cancer: None  Risk of Ankylosing Spondylitis: Onset at age <35, male, AND morning back stiffness  no     Patient also notes a dry cough for 2 weeks. No treatments tried.    Review of Systems   Constitutional, HEENT, cardiovascular, pulmonary, gi and gu systems are negative, except as otherwise noted.        Objective    BP (!) 164/110   Pulse 100   Temp 97.8  F (36.6  C) (Oral)   Resp 16   Ht 1.803 m (5' 11\")   Wt (!) 163.3 kg (360 lb)   SpO2 95%   BMI 50.21 kg/m    Body mass index is 50.21 kg/m .       Physical Exam   GENERAL: healthy, alert and no distress  EYES: Eyes grossly normal to inspection, PERRL and conjunctivae and sclerae normal  RESP: lungs clear to auscultation - no rales, rhonchi or wheezes  CV: regular rate and rhythm, normal S1 S2, no S3 or S4, no murmur, click or rub, no peripheral edema and peripheral pulses strong  MS: no gross " musculoskeletal defects noted, no edema  SKIN: no suspicious lesions or rashes  NEURO: Normal strength and tone, mentation intact and speech normal  Comprehensive back pain exam:  No tenderness, Range of motion not limited by pain, Lower extremity strength functional and equal on both sides, Lower extremity sensation normal and equal on both sides and Straight leg raise negative bilaterally  PSYCH: mentation appears normal, affect normal/bright

## 2023-03-23 ENCOUNTER — OFFICE VISIT (OUTPATIENT)
Dept: URGENT CARE | Facility: URGENT CARE | Age: 43
End: 2023-03-23
Payer: COMMERCIAL

## 2023-03-23 VITALS
RESPIRATION RATE: 14 BRPM | HEART RATE: 110 BPM | BODY MASS INDEX: 50.21 KG/M2 | OXYGEN SATURATION: 97 % | TEMPERATURE: 98.2 F | DIASTOLIC BLOOD PRESSURE: 86 MMHG | WEIGHT: 315 LBS | SYSTOLIC BLOOD PRESSURE: 142 MMHG

## 2023-03-23 DIAGNOSIS — N50.89 SCROTAL SWELLING: Primary | ICD-10-CM

## 2023-03-23 PROCEDURE — 99213 OFFICE O/P EST LOW 20 MIN: CPT | Performed by: PHYSICIAN ASSISTANT

## 2023-03-23 NOTE — PATIENT INSTRUCTIONS
Follow-up with [facility] tomorrow morning at 9:30 AM as referred.  Please avoid anything to eat or drink after midnight other than water.  As we discussed, should you have any onset of pain or any other concerning symptoms please go directly to emergency department for reevaluation.

## 2023-03-23 NOTE — PROGRESS NOTES
Assessment & Plan     Scrotal swelling  Patient is a 42-year-old male with painless scrotal swelling presenting to urgent care.  Differential includes inguinal hernia versus less likely etiology including cellulitis, Андрей's, torsion, or other more serious etiologies.  Patient well-appearing, vitals unremarkable, nonpainful and nonerythematous, will plan to refer patient to acute diagnostic services first thing tomorrow morning at 9:30 AM for ultrasound imaging of scrotum.  Depending on results may benefit from additional imaging and/or referral to surgical services for further evaluation and management considerations.  Did advise patient should he have any onset of pain or discomfort tonight that he should seek immediate treatment in the emergency department.  Patient was advised to avoid any oral intake following midnight tonight in preparation for evaluation in the morning.  - Referral to Acute and Diagnostic Services (Day of diagnostic / First order acute)     I spent a total of 20 minutes on the day of the visit.  Time spent doing chart review, history and exam, documentation and further activities per the note    0930 - appointment at [facility]    Return in 1 day (around 3/24/2023), for [facility] appointment at 9:30.    Guanakito Rosales PA-C  [facility] [place]      Facundo Madrid is a 42 year old male who presents to clinic today for the following health issues:  Chief Complaint   Patient presents with     Edema     PREFER a male DOCTOR ---Swelling in groin/scrotum area x Sunday night -- NO  pain  -- not sure how  it happened  -- taking nothing     HPI  Patient is a 42-year-old male with a history notable for obesity and hypertension presenting to urgent care for evaluation of painless scrotal swelling for the past 4 days.  Patient reports approximately a week ago felt a pimple type lesion in the perineal region which he reports squeezing and draining, at that time was not painful, and seems  to have resolved.  4 days ago his scrotum became suddenly enlarged without any provocation and not entirely sure when this occurred.  Patient reports that it is painless, does not notice any redness or warmth, and has not reported any change since that occurred.  Patient is an over the road , and was out of town during the week so has not presented for evaluation until today.  Patient reports that he had been diagnosed with having a hernia many years ago but reports that it was never treated stating that he had never been informed that it should be.  He denies any abdominal pain, urinary symptoms, GI symptoms, other skin changes or rash, nausea/vomiting, or other complaints.    Review of Systems  Focused ROS obtained, pertinent positives/negatives reviewed in the HPI.       Objective    BP (!) 142/86 (BP Location: Right arm, Patient Position: Chair, Cuff Size: Adult Regular)   Pulse 110   Temp 98.2  F (36.8  C) (Oral)   Resp 14   Wt (!) 163.3 kg (360 lb)   SpO2 97%   BMI 50.21 kg/m    Physical Exam   GENERAL: healthy, alert and no distress  RESP: Normal rate and effort  CV: regular rates and rhythm  ABDOMEN: soft, nontender and bowel sounds normal   (male): Scrotum significantly enlarged, nonerythematous and nontender to palpation.  Not edematous.  Testicles bilaterally palpated, no appreciable enlargement, no tenderness or masses.  Inguinal canals difficult to palpate, nontender.  Perineum nontender, no crepitus.  SKIN: Bilateral inguinal creases with darkened skin, no appreciable satellite lesions, nontender, no skin breakdown.

## 2023-03-24 ENCOUNTER — OFFICE VISIT (OUTPATIENT)
Dept: PEDIATRICS | Facility: CLINIC | Age: 43
End: 2023-03-24
Payer: COMMERCIAL

## 2023-03-24 ENCOUNTER — HOSPITAL ENCOUNTER (OUTPATIENT)
Dept: ULTRASOUND IMAGING | Facility: CLINIC | Age: 43
Discharge: HOME | End: 2023-03-24
Attending: PHYSICIAN ASSISTANT | Admitting: PHYSICIAN ASSISTANT
Payer: COMMERCIAL

## 2023-03-24 VITALS
DIASTOLIC BLOOD PRESSURE: 82 MMHG | WEIGHT: 315 LBS | HEART RATE: 107 BPM | BODY MASS INDEX: 44.1 KG/M2 | OXYGEN SATURATION: 94 % | TEMPERATURE: 97.6 F | RESPIRATION RATE: 20 BRPM | HEIGHT: 71 IN | SYSTOLIC BLOOD PRESSURE: 139 MMHG

## 2023-03-24 DIAGNOSIS — N50.89 SCROTAL SWELLING: ICD-10-CM

## 2023-03-24 DIAGNOSIS — N50.89 SCROTAL SWELLING: Primary | ICD-10-CM

## 2023-03-24 DIAGNOSIS — N43.3 BILATERAL HYDROCELE: ICD-10-CM

## 2023-03-24 DIAGNOSIS — N50.3 EPIDIDYMAL CYST: ICD-10-CM

## 2023-03-24 PROCEDURE — 99213 OFFICE O/P EST LOW 20 MIN: CPT | Performed by: PHYSICIAN ASSISTANT

## 2023-03-24 PROCEDURE — 76870 US EXAM SCROTUM: CPT

## 2023-03-24 ASSESSMENT — PAIN SCALES - GENERAL: PAINLEVEL: NO PAIN (0)

## 2023-03-24 NOTE — PROGRESS NOTES
{PROVIDER CHARTING PREFERENCE:928558}    Subjective   Julius is a 43 year old, presenting for the following health issues:  Testicular/scrotal Pain (Scrotum swollen but no pain)  No flowsheet data found.  HPI     Genitourinary - Male  Onset/Duration: 3/19/23  Description:   Dysuria (painful urination): No}  Hematuria (blood in urine): No  Frequency: No  Waking at night to urinate: No  Hesitancy (delay in urine): No  Retention (unable to empty): No  Decrease in urinary flow: No  Incontinence: No  Progression of Symptoms:  same  Accompanying Signs & Symptoms:  Fever: No  Back/Flank pain: No  Urethral discharge: No  Testicle lumps/masses/pain: YES  Nausea and/or vomiting: No  Abdominal pain: No  History:   History of frequent UTI s: No  History of kidney stones: No  History of hernias: YES  Personal or Family history of Prostate problems: No  Sexually active: YES  Precipitating or alleviating factors: None  Therapies tried and outcome: none  Review of Systems   {ROS COMP (Optional):108622}      Objective    There were no vitals taken for this visit.  There is no height or weight on file to calculate BMI.  Physical Exam   {Exam List (Optional):685548}    {Diagnostic Test Results (Optional):573764}    {AMBULATORY ATTESTATION (Optional):158251}

## 2023-03-24 NOTE — PROGRESS NOTES
Assessment/Plan:    With no pain or dysuria, I do not suspect epididymo-orchitis or orchitis. Do not suspect STI as pt had only been sexually active once on the day prior to symptom onset in recent years. Also do not suspect Fouriner's given no pain or perineal tenderness, crepitus, or edema. Do not suspect cellulitis due to no significant erythema and no pain/tenderness.   Ultrasound shows diffuse scrotal skin thickening, bilateral small hydroceles, and left epididymal head cyst. Do not suspect cyst or hydroceles would be causing diffuse scrotal swelling. Suspect swelling may be related to sexual activity the day prior or idiopathic scrotal edema. Advised scrotal elevation, ice, and NSAIDs; follow up with urology if not improving within about 1 week. Go to ER for worsening symptoms such as pain or inability to urinate, N/V, fever/chills.    See patient instructions below.    At the end of the encounter, I discussed results, diagnosis, medications. Discussed red flags for immediate return to clinic/ER, as well as indications for follow up if no improvement. Patient understood and agreed to plan. Patient was stable for discharge.      ICD-10-CM    1. Scrotal swelling  N50.89 Referral to Acute and Diagnostic Services (Day of diagnostic / First order acute)     US Testicular & Scrotum w Doppler Ltd     Adult Urology  Referral      2. Bilateral hydrocele  N43.3 Adult Urology  Referral      3. Epididymal cyst  N50.3 Adult Urology  Referral            Return in about 1 week (around 3/31/2023) for Follow up w/ urology if not better.    Katya Rahman, SJ, ANTHONY  [facility]  -----------------------------------------------------------------------------------------------------------------------------------------------------    HPI:  Julius Zuniga is a 43 year old male who presents for evaluation of the following:    Genitourinary - Male  Onset/Duration: 5 days  ago  Description: bilateral scrotal swelling  Dysuria (painful urination): No}  Hematuria (blood in urine): No  Frequency: No  Waking at night to urinate: No  Hesitancy (delay in urine): No  Retention (unable to empty): No  Decrease in urinary flow: No  Incontinence: No  Progression of Symptoms:  same  Accompanying Signs & Symptoms:  Fever: No  Back/Flank pain: No  Urethral discharge: No  Testicle lumps/masses/pain: no  Nausea and/or vomiting: No  Abdominal pain: No  History:   History of frequent UTI s: No  History of kidney stones: No  History of hernias: YES- told he had inguinal hernia over 20 years ago, does hurt from time to time  Personal or Family history of Prostate problems: No  Sexually active: YES- one time with his ex-wife the day before symptom onset, prior to that it had been 5-6 years since he had been sexually active. He denies any injury or anything unusual about this sexual activity the day prior  Precipitating or alleviating factors: None  Therapies tried and outcome: none     Patient reports approximately a week ago felt a pimple type lesion in the perineal region which he reports squeezing and draining, at that time was not painful, and seems to have resolved.  4 days ago his scrotum became suddenly enlarged without any provocation and not entirely sure when this occurred.  Patient reports that it is painless, does not notice any redness or warmth, and has not reported any change since that occurred.  He denies any abdominal pain, urinary symptoms, GI symptoms, other skin changes or rash, nausea/vomiting, or other complaints. No recent illness such as facial pain/swelling, throat pain, HA, myalgias, fever/chills.      Past Medical History:   Diagnosis Date     Essential hypertension        Vitals:    03/24/23 0930   BP: 139/82   BP Location: Left arm   Patient Position: Sitting   Cuff Size: Adult Large   Pulse: 107   Resp: 20   Temp: 97.6  F (36.4  C)   TempSrc: Oral   SpO2: 94%   Weight: (!)  "163.3 kg (360 lb)   Height: 1.803 m (5' 11\")       Physical Exam  Vitals and nursing note reviewed.   Pulmonary:      Effort: Pulmonary effort is normal.   Abdominal:      Comments: obese   Genitourinary:     Comments: Scrotum significantly enlarged, nonerythematous and nontender to palpation.  Not edematous.  Testicles bilaterally palpated, no appreciable enlargement, no tenderness or masses.  Inguinal canals difficult to palpate, nontender.  Perineum nontender, no crepitus.  Neurological:      Mental Status: He is alert.         Labs/Imaging:  No results found for this or any previous visit (from the past 24 hour(s)).  No results found for this or any previous visit (from the past 24 hour(s)).    Results for orders placed or performed during the hospital encounter of 03/24/23   US Testicular & Scrotum w Doppler Ltd     Status: None (Preliminary result)    Narrative    US TESTICULAR AND SCROTUM WITH DOPPLER LIMITED 3/24/2023 11:34 AM     HISTORY: Bilateral painless scrotal swelling x 5 days ago.  Scrotal  swelling.    FINDINGS:   Right Scrotum:       Testis: Normal.       Epididymis: Normal.       Small hydrocele. No varicocele.    Left Scrotum       Testis: Normal.       Epididymis: There is 0.8 x 0.8 x 1.1 cm epididymal head cyst.        Small hydrocele. No varicocele.     Doppler waveform analysis demonstrates bilateral testicular flow  without evidence for torsion.     Diffuse scrotal skin thickening.           Impression    IMPRESSION:   1. Diffuse scrotal skin thickening.  2. Bilateral small hydroceles.  3. 1.1 cm left epididymal head cyst.         Patient Instructions   Try scrotal elevation, ice, and ibuprofen 600 mg every 6-8 hours.         "

## 2023-04-13 DIAGNOSIS — R03.0 ELEVATED BLOOD PRESSURE READING WITHOUT DIAGNOSIS OF HYPERTENSION: ICD-10-CM

## 2023-04-13 DIAGNOSIS — E66.9 OBESITY (BMI 30-39.9): ICD-10-CM

## 2023-04-13 RX ORDER — HYDROCHLOROTHIAZIDE 12.5 MG/1
12.5 TABLET ORAL DAILY
Qty: 90 TABLET | Refills: 1 | Status: SHIPPED | OUTPATIENT
Start: 2023-04-13 | End: 2023-08-04

## 2023-04-13 NOTE — TELEPHONE ENCOUNTER
Patient is overdue for physical labs and BP check,.    Please call to have him set up and call pharmacy and discontinue the refill he can only have #90 without a refill until seen.        Thanks,       LOYD Kessler

## 2023-04-18 NOTE — TELEPHONE ENCOUNTER
Called and spoke with patient.     Advised we sent in 90 tablets, needing to schedule physical before we can continue to refill. Patient states he's on the road for the next 4 weeks, unsure when he can schedule. Advised to call back when able to schedule. Patient stated an understanding and agreed with plan.     Called pharmacy and they removed the refill.     NELSON URBAN RN on 4/18/2023 at 10:46 AM   [facility]

## 2023-06-26 DIAGNOSIS — I10 HYPERTENSION GOAL BP (BLOOD PRESSURE) < 130/80: ICD-10-CM

## 2023-06-27 RX ORDER — LISINOPRIL 30 MG/1
TABLET ORAL
Qty: 90 TABLET | Refills: 0 | Status: SHIPPED | OUTPATIENT
Start: 2023-06-27 | End: 2023-08-04 | Stop reason: DRUGHIGH

## 2023-06-27 NOTE — TELEPHONE ENCOUNTER
Medication is being filled for 1 time refill only due to:  Patient needs to be seen because it has been more than one year since last visit.      [facility] appt schedule  Kenzie SCHNEIDER RN, BSN

## 2023-07-16 ENCOUNTER — OFFICE VISIT (OUTPATIENT)
Dept: URGENT CARE | Facility: URGENT CARE | Age: 43
End: 2023-07-16
Payer: OTHER MISCELLANEOUS

## 2023-07-16 ENCOUNTER — ANCILLARY PROCEDURE (OUTPATIENT)
Dept: GENERAL RADIOLOGY | Facility: CLINIC | Age: 43
End: 2023-07-16
Attending: PHYSICIAN ASSISTANT
Payer: COMMERCIAL

## 2023-07-16 VITALS
TEMPERATURE: 99.1 F | DIASTOLIC BLOOD PRESSURE: 75 MMHG | RESPIRATION RATE: 20 BRPM | HEIGHT: 71 IN | WEIGHT: 315 LBS | OXYGEN SATURATION: 96 % | BODY MASS INDEX: 44.1 KG/M2 | HEART RATE: 110 BPM | SYSTOLIC BLOOD PRESSURE: 138 MMHG

## 2023-07-16 DIAGNOSIS — M25.571 ACUTE RIGHT ANKLE PAIN: ICD-10-CM

## 2023-07-16 DIAGNOSIS — M79.671 RIGHT FOOT PAIN: ICD-10-CM

## 2023-07-16 DIAGNOSIS — M25.531 RIGHT WRIST PAIN: ICD-10-CM
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
## 2023-07-16 DIAGNOSIS — S52.501A CLOSED FRACTURE OF DISTAL END OF RIGHT RADIUS, UNSPECIFIED FRACTURE MORPHOLOGY, INITIAL ENCOUNTER: Primary | ICD-10-CM

## 2023-07-16 PROCEDURE — 99214 OFFICE O/P EST MOD 30 MIN: CPT | Performed by: PHYSICIAN ASSISTANT

## 2023-07-16 PROCEDURE — 73110 X-RAY EXAM OF WRIST: CPT | Mod: TC | Performed by: RADIOLOGY

## 2023-07-16 PROCEDURE — 73630 X-RAY EXAM OF FOOT: CPT | Mod: TC | Performed by: RADIOLOGY

## 2023-07-16 PROCEDURE — 73610 X-RAY EXAM OF ANKLE: CPT | Mod: TC | Performed by: RADIOLOGY

## 2023-07-16 RX ORDER — HYDROCODONE BITARTRATE AND ACETAMINOPHEN 5; 325 MG/1; MG/1
1 TABLET ORAL EVERY 8 HOURS
Qty: 9 TABLET | Refills: 0 | Status: SHIPPED | OUTPATIENT
Start: 2023-07-16 | End: 2023-07-19

## 2023-07-16 NOTE — PATIENT INSTRUCTIONS
"        July 16, 2023 [place] [facility] Plan:     1. Please use the temporary wrist brace I provided for you today. The orthopedic (bone doctor) will help you get another form of brace or casting at your follow-up visit.     2. Ice and elevate your wrist and foot/ankle, 20 min every 2-3 hours, over the next several days     3. You can take over the counter Tylenol for pain (read the dosing directions carefully).     I also prescribed 9 pills of a strong narcotic pain medication(called Norco for breakthrough severe pain).     Norco also has 325 mg of Tylenol (also called acetaminophen) in each pill.     Do not take more than 3,000 mg of Tylenol in 24 hours (keep trace of the 325 mg per each pill of Norco and any over the counter doses of Tylenol you take)    4. You cannot drive until you have been seen by an orthopedic specialist and \"cleared\" for return to driving. Do not attempt driving while using the post-operative shoe I provided today.     5.   Follow-up with an orthopedic doctor (a referral is provided) in the next 1-2 days. If you can't get a short interval follow-up with our orthopedic doctor, you can try a larger orthopedic group like [facility] (they also have orthopedic urgent cares open from 8 am to 8 pm daily)    5. Follow-up immediately for medical re-evaluation if you develop any increased pain, redness or swelling.  Follow-up immediately if fingers or or toes becomes cold, blue, numb, or tingly.     "

## 2023-07-16 NOTE — PROGRESS NOTES
ASSESSMENT/PLAN:    (S52.501A) Closed fracture of distal end of right radius, unspecified fracture morphology, initial encounter  (primary encounter diagnosis)    MDM: Right distal radius fracture by my reading.  I did call to have the radiologist do a stat over read.  Radiologist is in agreement with my impression.  This x-ray finding does correlate the patient's maximal point of tenderness.  I suspect soft tissue injury right ankle and right foot.  I do not see any evidence of fracture right ankle or foot on my impression of x-rays today.  We do not have a large enough thumb spica wrist splint for him today, so he is placed in a temporary aluminum/foam splint (with Ace wrap) today--to get him orthopedics for likely casting tomorrow.  We do not have a cam boot large enough for patient here in our urgent care.  I provided a hard soled postoperative shoe for patient today, which she states does decrease his pain with standing/walking.  Plan as per below patient discharge summary/plan (which I reviewed with him verbally today and provided provided in printed form for home review).  Criteria for urgent and emergent follow-up was provided.  Future management of these acute injuries will be provided by orthopedics/podiatry/primary care team.    Plan: Orthopedic  Referral,         HYDROcodone-acetaminophen (NORCO) 5-325 MG         tablet, Wrist/Arm Supplies Order Sling; Right,         Wrist/Arm Supplies Order Other (comments)         (Right Padded Forearm)            Discharge Summary/Plan-     July 16, 2023 [facility] Plan:     1. Please use the temporary wrist brace I provided for you today. The orthopedic (bone doctor) will help you get another form of brace or casting at your follow-up visit.     2. Ice and elevate your wrist and foot/ankle, 20 min every 2-3 hours, over the next several days     3. You can take over the counter Tylenol for pain (read the dosing directions carefully).     I also  "prescribed 9 pills of a strong narcotic pain medication(called Norco for breakthrough severe pain).     Norco also has 325 mg of Tylenol (also called acetaminophen) in each pill.     Do not take more than 3,000 mg of Tylenol in 24 hours (keep trace of the 325 mg per each pill of Norco and any over the counter doses of Tylenol you take)    4. You cannot drive until you have been seen by an orthopedic specialist and \"cleared\" for return to driving. Do not attempt driving while using the post-operative shoe I provided today.     5.   Follow-up with an orthopedic doctor (a referral is provided) in the next 1-2 days. If you can't get a short interval follow-up with our orthopedic doctor, you can try a larger orthopedic group like [facility] (they also have orthopedic urgent cares open from 8 am to 8 pm daily)    5. Follow-up immediately for medical re-evaluation if you develop any increased pain, redness or swelling.  Follow-up immediately if fingers or or toes becomes cold, blue, numb, or tingly.       (M25.571) Acute right ankle pain  Plan: XR Ankle Right G/E 3 Views, Orthopedic          Referral, HYDROcodone-acetaminophen         (NORCO) 5-325 MG tablet, Ankle/Foot Bracing         Supplies DME Post-op Shoe; Right    (M79.671) Right foot pain  Plan: XR Foot Right G/E 3 Views, Orthopedic         Referral, HYDROcodone-acetaminophen (NORCO)         5-325 MG tablet      (M25.531) Right wrist pain  Plan: XR Wrist Right G/E 3 Views, Orthopedic          Referral, HYDROcodone-acetaminophen         (NORCO) 5-325 MG tablet      (Y99.0) Work related injury  Plan: XR Ankle Right G/E 3 Views, XR Foot Right G/E 3        Views, XR Wrist Right G/E 3 Views, Orthopedic          Referral, HYDROcodone-acetaminophen         (NORCO) 5-325 MG tablet        This progress note has been dictated, with use of voice recognition software. Any grammatical, typographical, or context errors are " "unintentional and inherent to use of voice recognition software.        ---------------------------    SUBJECTIVE:  Julius Zuniga  is a 43 year old male who presents to urgent care today for evaluation of multiple injuries he to a work injury that occurred approximately 3:20 PM relates directly today.    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  He now has severe pain in right wrist (points to distal radius), right ankle, right foot, and has an abrasion on his left knee.    Patient denies any associated loss of consciousness, head injury, or neck injury.    ANKLE/FOOT PAIN: 3-10 sitting and 6-7/10 with weight bearing     RIGHT WRIST PAIN: 6-7/10-- and worse with ROM in any direction     Past Medical History:   Diagnosis Date     Essential hypertension      Patient Active Problem List   Diagnosis     Elevated blood pressure reading without diagnosis of hypertension     Obesity (BMI 30-39.9)       No Known Allergies      OBJECTIVE:  /75   Pulse 110   Temp 99.1  F (37.3  C) (Tympanic)   Resp 20   Ht 1.803 m (5' 11\")   Wt (!) 167.8 kg (370 lb)   SpO2 96%   BMI 51.60 kg/m       GENERAL:  Alert. NAD    RIGHT WRIST EXAM: Positive for distal radius pain.  No focal pain in the anatomical snuffbox.  He has pain with range of motion in all directions today--so I intentionally did not further pursue the limits of his range of motion today.      RIGHT ANKLE/FOOT : There is swelling and tenderness over the medial aspect of ankle, dorsum of entire foot. No focal pain of phalanges.  The fifth metatarsal is not tender. The ankle joint is intact without excessive opening on stressing.       SKIN: Abrasion left knee.  No lacerations, or bruising    NEURO: PERRLA. Sensation intact to light touch along entire UE and into all " fingertips and toe tips today.  Alert and oriented.  Normal speech and mentation.  CN II/XII grossly intact.        VASCULAR: No compromise to distal circulation. Brachial and radial pulses are 2+ and equal to that of unaffected extremity. Good/brisk capillary refill to all digits of right fingers and right toes confirmed today.        Results for orders placed or performed in visit on 07/16/23   XR Wrist Right G/E 3 Views     Status: None    Narrative    EXAM: XR WRIST RIGHT G/E 3 VIEWS  LOCATION: [facility]  DATE: 7/16/2023    INDICATION: r o fracture. 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      Impression    IMPRESSION: There is subtle lucency within the distal radius, best seen on the lateral view and equivocal for acute, nondisplaced fracture. Recommend follow-up radiographs for confirmation. No displaced fracture is identified. Normal joint spacing and   alignment.    NOTE: ABNORMAL REPORT    THE DICTATION ABOVE DESCRIBES AN ABNORMALITY FOR WHICH FOLLOW-UP IS NEEDED.    Results for orders placed or performed in visit on 07/16/23   XR Foot Right G/E 3 Views     Status: None    Narrative    EXAM: XR FOOT RIGHT G/E 3 VIEWS, XR ANKLE RIGHT G/E 3 VIEWS  LOCATION: [facility]  DATE: 7/16/2023    INDICATION: r o fracture. pain after twist fall while getting out of semi truck at work today ~ 3:30  COMPARISON: None.      Impression    IMPRESSION: No acute fracture or dislocation is identified. Ankle mortise is congruent. Normal joint spacing. There is diffuse soft tissue swelling over the foot and ankle.   Results for orders placed or performed in visit on 07/16/23   XR Ankle Right G/E 3 Views     Status: None    Narrative    EXAM: XR FOOT RIGHT G/E 3 VIEWS, XR ANKLE RIGHT G/E 3 VIEWS  LOCATION: [facility]  DATE: 7/16/2023    INDICATION: r o fracture. 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      Impression    IMPRESSION: No acute fracture or dislocation is identified. Ankle mortise is congruent. Normal joint spacing. There is diffuse soft tissue swelling over the foot and ankle.

## 2023-07-16 NOTE — LETTER
July 16, 2023      Julius Zuniga  [address] [place] [id number]        To Whom It May Concern:    Julius Zuniga was seen here today and was diagnosed with multiple orthopedic injuries. Please excuse him from work until he has been further evaluated by an orthopedic specialist.     Any further work absence or work restrictions will be as per the orthopedic specialist.     Sincerely,        Jesus Singh PA-C

## 2023-07-17 ENCOUNTER — OFFICE VISIT (OUTPATIENT)
Dept: ORTHOPEDICS | Facility: CLINIC | Age: 43
End: 2023-07-17
Payer: OTHER MISCELLANEOUS

## 2023-07-17 ENCOUNTER — OFFICE VISIT (OUTPATIENT)
Dept: PODIATRY | Facility: CLINIC | Age: 43
End: 2023-07-17
Attending: PHYSICIAN ASSISTANT
Payer: OTHER MISCELLANEOUS

## 2023-07-17 VITALS
BODY MASS INDEX: 44.1 KG/M2 | SYSTOLIC BLOOD PRESSURE: 140 MMHG | WEIGHT: 315 LBS | HEIGHT: 71 IN | DIASTOLIC BLOOD PRESSURE: 90 MMHG

## 2023-07-17 VITALS
HEIGHT: 71 IN | SYSTOLIC BLOOD PRESSURE: 140 MMHG | WEIGHT: 315 LBS | DIASTOLIC BLOOD PRESSURE: 90 MMHG | BODY MASS INDEX: 44.1 KG/M2

## 2023-07-17 DIAGNOSIS — M25.571 ACUTE RIGHT ANKLE PAIN: ICD-10-CM

## 2023-07-17 DIAGNOSIS — E66.9 OBESITY (BMI 30-39.9): ICD-10-CM

## 2023-07-17 DIAGNOSIS — M79.671 RIGHT FOOT PAIN: ICD-10-CM
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
## 2023-07-17 DIAGNOSIS — S52.551A OTHER CLOSED EXTRA-ARTICULAR FRACTURE OF DISTAL END OF RIGHT RADIUS, INITIAL ENCOUNTER: Primary | ICD-10-CM

## 2023-07-17 DIAGNOSIS — S52.501A CLOSED FRACTURE OF DISTAL END OF RIGHT RADIUS, UNSPECIFIED FRACTURE MORPHOLOGY, INITIAL ENCOUNTER: ICD-10-CM

## 2023-07-17 DIAGNOSIS — R03.0 ELEVATED BLOOD PRESSURE READING WITHOUT DIAGNOSIS OF HYPERTENSION: ICD-10-CM

## 2023-07-17 DIAGNOSIS — M25.531 RIGHT WRIST PAIN: ICD-10-CM

## 2023-07-17 PROCEDURE — 99204 OFFICE O/P NEW MOD 45 MIN: CPT | Performed by: PODIATRIST

## 2023-07-17 PROCEDURE — 25600 CLTX DST RDL FX/EPHYS SEP WO: CPT | Performed by: FAMILY MEDICINE

## 2023-07-17 PROCEDURE — 99203 OFFICE O/P NEW LOW 30 MIN: CPT | Mod: 57 | Performed by: FAMILY MEDICINE

## 2023-07-17 ASSESSMENT — PAIN SCALES - GENERAL: PAINLEVEL: SEVERE PAIN (6)

## 2023-07-17 NOTE — LETTER
July 17, 2023      Julius Zuniga  [address] [facility] [address]        To Whom It May Concern:    Julius Zuniga  was seen on 7/17/23.  Please excuse him from work until 7/24/23 due to injury.  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  Patient will follow up on 8/1/23.        Sincerely,        Albert Yeo, MD

## 2023-07-17 NOTE — PROGRESS NOTES
"Foot & Ankle Surgery  July 17, 2023    CC: Right foot work comp injury    I was asked to see Julius CUBA Nadia regarding the chief complaint by: Urgent care    HPI:  Pt is a 43 year old male who presents with above complaint.  The patient was seen in urgent care yesterday for an injury that occurred earlier in the day.  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  Right foot and ankle x-ray showed no acute musculoskeletal pathology.  Right wrist x-ray showed possible nondisplaced/incomplete distal radius fracture.  Pain in the foot 6 out of 10, comes and goes and worse with weightbearing.  \"Ice\" for treatment.    ROS:   Pos for CC.  The patient denies current nausea, vomiting, chills, fevers, belly pain, calf pain, chest pain or SOB.  Complete remainder of ROS is otherwise neg.    VITALS:    Vitals:    07/17/23 1443   BP: (!) 140/90   Weight: (!) 167.8 kg (370 lb)   Height: 1.803 m (5' 11\")       PMH:    Past Medical History:   Diagnosis Date     Essential hypertension        SXHX:  No past surgical history on file.     MEDS:    Current Outpatient Medications   Medication     albuterol (PROAIR HFA/PROVENTIL HFA/VENTOLIN HFA) 108 (90 Base) MCG/ACT inhaler     hydrochlorothiazide (HYDRODIURIL) 12.5 MG tablet     HYDROcodone-acetaminophen (NORCO) 5-325 MG tablet     lisinopril (ZESTRIL) 30 MG tablet     No current facility-administered medications for this visit.       ALL:   No Known Allergies    FMH:    Family History   Problem Relation Age of Onset     Obesity Mother      Obesity Father      Obesity Sister      Obesity Brother        SocHx:    Social History     Socioeconomic History     Marital status: Single     Spouse name: Not on file     Number of children: Not on file     Years of education: Not on file     Highest education level: Not on file   Occupational History     Not on file   Tobacco Use     Smoking status: Every Day     " Packs/day: 0.25     Years: 26.00     Pack years: 6.50     Types: Cigarettes     Start date: 5/1/1996     Smokeless tobacco: Never   Vaping Use     Vaping Use: Never used   Substance and Sexual Activity     Alcohol use: Yes     Alcohol/week: 1.0 standard drink of alcohol     Types: 1 Cans of beer per week     Drug use: Never     Sexual activity: Yes     Partners: Female   Other Topics Concern     Parent/sibling w/ CABG, MI or angioplasty before 65F 55M? Not Asked   Social History Narrative     Not on file     Social Determinants of Health     Financial Resource Strain: Not on file   Food Insecurity: Not on file   Transportation Needs: Not on file   Physical Activity: Not on file   Stress: Not on file   Social Connections: Not on file   Intimate Partner Violence: Not on file   Housing Stability: Not on file           EXAMINATION:  Gen:   No apparent distress  Neuro:   A&Ox3, no deficits  Psych:    Answering questions appropriately for age and situation with normal affect  Head:    NCAT  Eye:    Visual scanning without deficit  Ear:    Response to auditory stimuli wnl  Lung:    Non-labored breathing on RA noted  Abd:    NTND per patient report  Lymph:    Neg for pitting/non-pitting edema BLE  Vasc:    Pulses palpable, CFT minimally delayed  Neuro:    Light touch sensation intact to all sensory nerve distributions without paresthesias  Derm:    Neg for nodules, lesions or ulcerations  MSK:    Right lower extremity -knee to ankle examination is negative.  Medial and lateral ankle ligaments unremarkable.  There is mild dorsal midfoot pain but the Lisfranc joint complex is unremarkable.  He has mild tenderness on palpation of the sesamoids.  He points to the arch as to where he is having pain but I am otherwise unable to elicit any acute arch pain including abductor hallucis muscle belly and plantar fascia.  Calf:    Neg for redness, swelling or tenderness      Imaging: X-rays right foot/ankle 7/16/2023 - IMPRESSION: No  acute fracture or dislocation is identified. Ankle mortise is congruent. Normal joint spacing. There is diffuse soft tissue swelling over the foot and ankle.    Assessment:  43 year old male with right foot work comp injury without acute fracture with sesamoid and arch pain      Medical Decision Making/Plan:  Discussed etiologies, anatomy and options  1.  Urgent care follow-up for right foot sesamoid and arch pain in setting of recent work comp injury  -I personally reviewed and interpreted the patient's lower extremity history pertinent to today's visit, including imaging/labs, in preparation for initiating a treatment program.  -I personally reviewed and interpreted the right foot and ankle x-rays.  No acute pathology is seen.  -Weightbearing as tolerated and comfortable shoes; minimize shoeless walking as shoes help to provide cushion and padding to minimize strain on the injured areas  -RICE/NSAID versus Tylenol as needed based on pain  -We discussed the option for a removable walking cast boot and prescription NSAID.  He declined today but will consider in 4 weeks if the above plan has failed to provide sufficient relief.  Okay to cancel for a follow-up appointment if the foot is otherwise doing well    Follow up: 4 weeks or sooner with acute issues      Patient's medical history was reviewed today      Shlomo Crawford DPM FACFAS FACFAOM  Podiatric Foot & Ankle Surgeon  [facility]  [phone]    Disclaimer: This note consists of symbols derived from keyboarding, dictation and/or voice recognition software. As a result, there may be errors in the script that have gone undetected. Please consider this when interpreting information found in this chart.

## 2023-07-17 NOTE — PROGRESS NOTES
ASSESSMENT & PLAN  Patient Instructions     1. Other closed extra-articular fracture of distal end of right radius, initial encounter      -Patient has right wrist pain after a fall likely due to distal radial fracture  -I reviewed recent x-rays of the right wrist shows a lucency of the distal radius which likely represents nondisplaced fracture  -Patient does have significant pain, weakness and dysfunction on exam today  -Patient was placed in a short arm cast.  Patient was given cast care instructions  -Patient will be held out of work as a  until 7/24/2023.  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  -We will follow-up on Aug 1st at 10:20AM to be cut out of cast and repeat xrays taken.  -Call direct clinic number [[phone]] at any time with questions or concerns.    Albert Yeo MD [facility]  [facility]          -----    SUBJECTIVE  Julius Zuniga is a/an 43 year old Left handed male who is seen as a self referral for evaluation of right wrist pain. The patient is seen by themselves.    Onset: 1 day(s) ago - 7/16/23. Patient describes injury as occurring at work. 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  Location of Pain: right dorsal wrist, right ulnar and radial wrist / distal forearm  Rating of Pain at worst: 8/10  Rating of Pain Currently: 7/10  Worsened by: any movement / use of right hand  Better with: rest / activity avoidance  Treatments tried: rest/activity avoidance, Tylenol, previous imaging (xray 7/16/23) and casting/splinting/bracing  Associated symptoms: swelling  Orthopedic history: YES - patient also injured right foot from this same fall  Relevant surgical history: NO  Social history: social history: works [employer]    Past Medical History:   Diagnosis Date     Essential hypertension      Social History     Socioeconomic History     Marital status: Single  "  Tobacco Use     Smoking status: Every Day     Packs/day: 0.25     Years: 26.00     Pack years: 6.50     Types: Cigarettes     Start date: 5/1/1996     Smokeless tobacco: Never   Vaping Use     Vaping Use: Never used   Substance and Sexual Activity     Alcohol use: Yes     Alcohol/week: 1.0 standard drink of alcohol     Types: 1 Cans of beer per week     Drug use: Never     Sexual activity: Yes     Partners: Female         Patient's past medical, surgical, social, and family histories were reviewed today and no changes are noted.    REVIEW OF SYSTEMS:  10 point ROS is negative other than symptoms noted above in HPI, Past Medical History or as stated below  Constitutional: NEGATIVE for fever, chills, change in weight  Skin: NEGATIVE for worrisome rashes, moles or lesions  GI/: NEGATIVE for bowel or bladder changes  Neuro: NEGATIVE for weakness, dizziness or paresthesias    OBJECTIVE:  BP (!) 140/90   Ht 1.803 m (5' 11\")   Wt (!) 167.8 kg (370 lb)   BMI 51.60 kg/m     General: healthy, alert and in no distress  HEENT: no scleral icterus or conjunctival erythema  Skin: no suspicious lesions or rash. No jaundice.  CV: regular rhythm by palpation  Resp: normal respiratory effort without conversational dyspnea   Psych: normal mood and affect  Gait: normal steady gait with appropriate coordination and balance  Neuro: Normal sensory exam of bilateral hands. Normal 2 pt discrimination.   MSK:  RIGHT WRIST  Inspection:    No swelling or obvious deformity or asymmetry  Palpation:    Tender about the distal radius and distal ulna. Remainder of bony and ligamentous line marks are nontender.     Metacarpals: normal    Thumb: normal    Fingers: normal  Range of Motion:    ROM (active and passive) limited in all planes secondary to pain  Strength:  Limited in all directions due to pain. Normal pinch strength.  Special Tests:    Positive: None        Independent visualization of the below image:  EXAM: XR WRIST RIGHT G/E 3 " VIEWS  LOCATION: [facility]  DATE: 7/16/2023     INDICATION: r o fracture. 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                                                                   IMPRESSION: There is subtle lucency within the distal radius, best seen on the lateral view and equivocal for acute, nondisplaced fracture. Recommend follow-up radiographs for confirmation. No displaced fracture is identified. Normal joint spacing and   alignment.      Cast/splint application    Date/Time: 7/17/2023 3:48 PM    Performed by: Yeo, Albert, MD  Authorized by: Yeo, Albert, MD    Consent:     Consent obtained:  Verbal    Consent given by:  Patient    Risks discussed:  Numbness, pain and swelling    Alternatives discussed:  No treatment, delayed treatment and alternative treatment  Pre-procedure details:     Sensation:  Normal  Procedure details:     Laterality:  Right    Location:  Wrist    Strapping: no      Cast type:  Short arm    Supplies:  Fiberglass  Post-procedure details:     Pain:  Improved    Sensation:  Normal    Patient tolerance of procedure:  Tolerated well, no immediate complications    Patient provided with cast or splint care instructions: Yes          Albert Yeo MD [facility]

## 2023-07-17 NOTE — LETTER
7/17/2023         RE: Julius Zuniga  [address] [address]        Dear Colleague,    Thank you for referring your patient, Julius Zuniga, to the [facility] [place]. Please see a copy of my visit note below.    ASSESSMENT & PLAN  Patient Instructions     1. Other closed extra-articular fracture of distal end of right radius, initial encounter      -Patient has right wrist pain after a fall likely due to distal radial fracture  -I reviewed recent x-rays of the right wrist shows a lucency of the distal radius which likely represents nondisplaced fracture  -Patient does have significant pain, weakness and dysfunction on exam today  -Patient was placed in a short arm cast.  Patient was given cast care instructions  -Patient will be held out of work as a  until 7/24/2023.  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  -We will follow-up on Aug 1st at 10:20AM to be cut out of cast and repeat xrays taken.  -Call direct clinic number [[phone]] at any time with questions or concerns.    Albert Yeo MD [facility]  [facility]          -----    SUBJECTIVE  Julius Zuniga is a/an 43 year old Left handed male who is seen as a self referral for evaluation of right wrist pain. The patient is seen by themselves.    Onset: 1 day(s) ago - 7/16/23. Patient describes injury as occurring at work. 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  Location of Pain: right dorsal wrist, right ulnar and radial wrist / distal forearm  Rating of Pain at worst: 8/10  Rating of Pain Currently: 7/10  Worsened by: any movement / use of right hand  Better with: rest / activity avoidance  Treatments tried: rest/activity avoidance, Tylenol, previous imaging (xray 7/16/23) and casting/splinting/bracing  Associated symptoms: swelling  Orthopedic history: YES - patient also  "injured right foot from this same fall  Relevant surgical history: NO  Social history: social history: works [employer]    Past Medical History:   Diagnosis Date     Essential hypertension      Social History     Socioeconomic History     Marital status: Single   Tobacco Use     Smoking status: Every Day     Packs/day: 0.25     Years: 26.00     Pack years: 6.50     Types: Cigarettes     Start date: 5/1/1996     Smokeless tobacco: Never   Vaping Use     Vaping Use: Never used   Substance and Sexual Activity     Alcohol use: Yes     Alcohol/week: 1.0 standard drink of alcohol     Types: 1 Cans of beer per week     Drug use: Never     Sexual activity: Yes     Partners: Female         Patient's past medical, surgical, social, and family histories were reviewed today and no changes are noted.    REVIEW OF SYSTEMS:  10 point ROS is negative other than symptoms noted above in HPI, Past Medical History or as stated below  Constitutional: NEGATIVE for fever, chills, change in weight  Skin: NEGATIVE for worrisome rashes, moles or lesions  GI/: NEGATIVE for bowel or bladder changes  Neuro: NEGATIVE for weakness, dizziness or paresthesias    OBJECTIVE:  BP (!) 140/90   Ht 1.803 m (5' 11\")   Wt (!) 167.8 kg (370 lb)   BMI 51.60 kg/m     General: healthy, alert and in no distress  HEENT: no scleral icterus or conjunctival erythema  Skin: no suspicious lesions or rash. No jaundice.  CV: regular rhythm by palpation  Resp: normal respiratory effort without conversational dyspnea   Psych: normal mood and affect  Gait: normal steady gait with appropriate coordination and balance  Neuro: Normal sensory exam of bilateral hands. Normal 2 pt discrimination.   MSK:  RIGHT WRIST  Inspection:    No swelling or obvious deformity or asymmetry  Palpation:    Tender about the distal radius and distal ulna. Remainder of bony and ligamentous line marks are nontender.     Metacarpals: normal    Thumb: normal    Fingers: normal  Range of " Motion:    ROM (active and passive) limited in all planes secondary to pain  Strength:  Limited in all directions due to pain. Normal pinch strength.  Special Tests:    Positive: None        Independent visualization of the below image:  EXAM: XR WRIST RIGHT G/E 3 VIEWS  LOCATION: [facility]  DATE: 7/16/2023     INDICATION: r o fracture. 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                                                                   IMPRESSION: There is subtle lucency within the distal radius, best seen on the lateral view and equivocal for acute, nondisplaced fracture. Recommend follow-up radiographs for confirmation. No displaced fracture is identified. Normal joint spacing and   alignment.      Cast/splint application    Date/Time: 7/17/2023 3:48 PM    Performed by: Yeo, Albert, MD  Authorized by: Yeo, Albert, MD    Consent:     Consent obtained:  Verbal    Consent given by:  Patient    Risks discussed:  Numbness, pain and swelling    Alternatives discussed:  No treatment, delayed treatment and alternative treatment  Pre-procedure details:     Sensation:  Normal  Procedure details:     Laterality:  Right    Location:  Wrist    Strapping: no      Cast type:  Short arm    Supplies:  Fiberglass  Post-procedure details:     Pain:  Improved    Sensation:  Normal    Patient tolerance of procedure:  Tolerated well, no immediate complications    Patient provided with cast or splint care instructions: Yes          Albert Yeo MD [facility]        Again, thank you for allowing me to participate in the care of your patient.        Sincerely,        Albert Yeo, MD

## 2023-07-17 NOTE — PATIENT INSTRUCTIONS
1. Other closed extra-articular fracture of distal end of right radius, initial encounter      -Patient has right wrist pain after a fall likely due to distal radial fracture  -I reviewed recent x-rays of the right wrist shows a lucency of the distal radius which likely represents nondisplaced fracture  -Patient does have significant pain, weakness and dysfunction on exam today  -Patient was placed in a short arm cast.  Patient was given cast care instructions  -Patient will be held out of work as a  until 7/24/2023.  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  -We will follow-up on Aug 1st at 10:20AM to be cut out of cast and repeat xrays taken.  -Call direct clinic number [[phone]] at any time with questions or concerns.    Albert Yeo MD [facility]  [facility] [place]

## 2023-07-17 NOTE — PATIENT INSTRUCTIONS
Thank you for choosing [facility] Podiatry / Foot & Ankle Surgery!    DR. ROJAS'S CLINIC LOCATIONS:     [facility] (Friday) TRIAGE LINE: [phone] [address]  APPOINTMENTS: [phone]   [place] [address] RADIOLOGY: [phone]    PHYSICAL THERAPY: [phone]    SET UP SURGERY: [phone]   [facility] (Mon-Tues AM-Thurs) BILLING QUESTIONS: [phone] [address]  [address] FAX: [phone]   [place] [address]      You were seen today for the right foot work comp injury.  Your exam shows mild discomfort along 2 small bones in the bottom of the arch.  Recommendations:    1.  Weightbearing as tolerated and comfortable shoes.  Minimize shoeless walking as much as you are able to.  Shoes provide cushion padding and shock absorption to help to alleviate the stress on the sore areas;    2.  Rest, ice, elevate and utilize anti-inflammatories or Tylenol as needed based on symptoms.    Schedule IV week follow-up appointment.  If at that point, the foot is otherwise doing well, you are okay to cancel.  If not, we will reassess and consider advanced imaging studies and a walking cast boot    PRICE Therapy    Many aches and pains throughout the foot and ankle can be helped with many simple treatments.  This is usually described as PRICE Therapy.      P - Protection - often times, inflammation/pain in the lower extremity is not able to improve simply because the areas involved are never allowed to rest.  Every step we take can bother the problematic area.  Protecting those areas is an important step in the healing process.  This may involve a walking cast boot, a special insert/orthotic device, an ankle brace, or simply avoiding barefoot walking.    R - Rest - in addition to protecting the foot/ankle, resting is an important, but often times difficult, treatment option.  Getting off your feet when they bother you, and specifically avoiding activities that cause pain/discomfort, are  "very beneficial to prevent, and treat, foot/ankle pain.  \"If there's something that makes it hurt(eg activities, shoe gear), and you keep doing the thing that makes it hurt, it's just going to keep hurting\".      I - Ice - icing regularly can help to decrease inflammation and swelling in the foot, thus decreasing pain.  Using an ice pack or a bag of frozen peas works very well.  Ice for 20 minutes multiple times per day as needed.  Do not place the ice directly on the skin as this can cause tissue damage.    C - Compression - using a compression wrap or an ACE wrap can help to decrease swelling, which can help to decrease pain.  Wearing the wraps is generally not needed at night, but they should be worn on a regular basis when you are going to be on your feet for prolonged periods as gravity tends to pull fluids down to your feet/ankles.    E - Elevation - elevating your lower extremities multiple times daily for 15-20 minutes can help to decrease swelling, which works well in decreasing pain levels.      NSAID/Tylenol - An anti-inflammatory, like Aleve or ibuprofen, and/or a pain medication, such as Tylenol, can help to improve pain levels and get the issue resolved sooner rather than later.  Also, topical anti-inflammatory medications like Voltaren gel can be used for local treatment, with the benefit of avoiding system issues with oral medications.  Anyone with liver issues should be careful with Tylenol, and anyone with high blood pressure or heart, stomach or kidney issues should be careful with anti-inflammatories.  Please ask if you have questions about these medications, including dosage.      "

## 2023-07-17 NOTE — LETTER
July 17, 2023      Julius Zuniga  [address] [address]        To Whom It May Concern:    Julius Zuniga  was seen on 7/17/23.  Please excuse him from work until 7/24/23 due to injury.        Sincerely,        Albert Yeo, MD

## 2023-07-19 RX ORDER — HYDROCHLOROTHIAZIDE 12.5 MG/1
TABLET ORAL
Qty: 90 TABLET | Refills: 1 | OUTPATIENT
Start: 2023-07-19

## 2023-07-22 ENCOUNTER — HEALTH MAINTENANCE LETTER (OUTPATIENT)
Age: 43
End: 2023-07-22

## 2023-07-28 ENCOUNTER — OFFICE VISIT (OUTPATIENT)
Dept: FAMILY MEDICINE | Facility: CLINIC | Age: 43
End: 2023-07-28
Payer: COMMERCIAL

## 2023-07-28 VITALS
SYSTOLIC BLOOD PRESSURE: 136 MMHG | OXYGEN SATURATION: 98 % | RESPIRATION RATE: 15 BRPM | HEART RATE: 115 BPM | BODY MASS INDEX: 44.1 KG/M2 | TEMPERATURE: 98.4 F | HEIGHT: 71 IN | WEIGHT: 315 LBS | DIASTOLIC BLOOD PRESSURE: 90 MMHG

## 2023-07-28 DIAGNOSIS — Z00.00 ROUTINE GENERAL MEDICAL EXAMINATION AT A HEALTH CARE FACILITY: Primary | ICD-10-CM

## 2023-07-28 DIAGNOSIS — Z71.6 ENCOUNTER FOR SMOKING CESSATION COUNSELING: ICD-10-CM

## 2023-07-28 DIAGNOSIS — Z13.220 SCREENING FOR HYPERLIPIDEMIA: ICD-10-CM

## 2023-07-28 DIAGNOSIS — E66.01 CLASS 3 SEVERE OBESITY DUE TO EXCESS CALORIES WITH SERIOUS COMORBIDITY AND BODY MASS INDEX (BMI) OF 50.0 TO 59.9 IN ADULT (H): ICD-10-CM

## 2023-07-28 DIAGNOSIS — Z13.0 SCREENING FOR DEFICIENCY ANEMIA: ICD-10-CM

## 2023-07-28 DIAGNOSIS — Z11.4 SCREENING FOR HIV (HUMAN IMMUNODEFICIENCY VIRUS): ICD-10-CM

## 2023-07-28 DIAGNOSIS — R00.0 TACHYCARDIA: ICD-10-CM

## 2023-07-28 DIAGNOSIS — Z13.29 SCREENING FOR THYROID DISORDER: ICD-10-CM

## 2023-07-28 DIAGNOSIS — I10 HYPERTENSION GOAL BP (BLOOD PRESSURE) < 130/80: ICD-10-CM

## 2023-07-28 DIAGNOSIS — E66.813 CLASS 3 SEVERE OBESITY DUE TO EXCESS CALORIES WITH SERIOUS COMORBIDITY AND BODY MASS INDEX (BMI) OF 50.0 TO 59.9 IN ADULT (H): ICD-10-CM

## 2023-07-28 DIAGNOSIS — Z11.59 NEED FOR HEPATITIS C SCREENING TEST: ICD-10-CM

## 2023-07-28 PROBLEM — R03.0 ELEVATED BLOOD PRESSURE READING WITHOUT DIAGNOSIS OF HYPERTENSION: Status: RESOLVED | Noted: 2022-05-06 | Resolved: 2023-07-28

## 2023-07-28 PROBLEM — E66.9 OBESITY (BMI 30-39.9): Status: RESOLVED | Noted: 2022-05-06 | Resolved: 2023-07-28

## 2023-07-28 LAB
ALBUMIN SERPL BCG-MCNC: 4.3 G/DL (ref 3.5–5.2)
ALP SERPL-CCNC: 82 U/L (ref 40–129)
ALT SERPL W P-5'-P-CCNC: 19 U/L (ref 0–70)
ANION GAP SERPL CALCULATED.3IONS-SCNC: 10 MMOL/L (ref 7–15)
AST SERPL W P-5'-P-CCNC: 20 U/L (ref 0–45)
BILIRUB SERPL-MCNC: 0.4 MG/DL
BUN SERPL-MCNC: 9.4 MG/DL (ref 6–20)
CALCIUM SERPL-MCNC: 9.6 MG/DL (ref 8.6–10)
CHLORIDE SERPL-SCNC: 100 MMOL/L (ref 98–107)
CHOLEST SERPL-MCNC: 180 MG/DL
CREAT SERPL-MCNC: 0.87 MG/DL (ref 0.67–1.17)
DEPRECATED HCO3 PLAS-SCNC: 27 MMOL/L (ref 22–29)
ERYTHROCYTE [DISTWIDTH] IN BLOOD BY AUTOMATED COUNT: 14 % (ref 10–15)
GFR SERPL CREATININE-BSD FRML MDRD: >90 ML/MIN/1.73M2
GLUCOSE SERPL-MCNC: 114 MG/DL (ref 70–99)
HBA1C MFR BLD: 7.5 % (ref 0–5.6)
HCT VFR BLD AUTO: 40.7 % (ref 40–53)
HDLC SERPL-MCNC: 38 MG/DL
HGB BLD-MCNC: 13.4 G/DL (ref 13.3–17.7)
LDLC SERPL CALC-MCNC: 122 MG/DL
MCH RBC QN AUTO: 30 PG (ref 26.5–33)
MCHC RBC AUTO-ENTMCNC: 32.9 G/DL (ref 31.5–36.5)
MCV RBC AUTO: 91 FL (ref 78–100)
NONHDLC SERPL-MCNC: 142 MG/DL
PLATELET # BLD AUTO: 306 10E3/UL (ref 150–450)
POTASSIUM SERPL-SCNC: 4.4 MMOL/L (ref 3.4–5.3)
PROT SERPL-MCNC: 7.5 G/DL (ref 6.4–8.3)
RBC # BLD AUTO: 4.47 10E6/UL (ref 4.4–5.9)
SODIUM SERPL-SCNC: 137 MMOL/L (ref 136–145)
TRIGL SERPL-MCNC: 99 MG/DL
TSH SERPL DL<=0.005 MIU/L-ACNC: 1.28 UIU/ML (ref 0.3–4.2)
WBC # BLD AUTO: 10.7 10E3/UL (ref 4–11)

## 2023-07-28 PROCEDURE — 80061 LIPID PANEL: CPT | Performed by: NURSE PRACTITIONER

## 2023-07-28 PROCEDURE — 84443 ASSAY THYROID STIM HORMONE: CPT | Performed by: NURSE PRACTITIONER

## 2023-07-28 PROCEDURE — 83036 HEMOGLOBIN GLYCOSYLATED A1C: CPT | Performed by: NURSE PRACTITIONER

## 2023-07-28 PROCEDURE — 85027 COMPLETE CBC AUTOMATED: CPT | Performed by: NURSE PRACTITIONER

## 2023-07-28 PROCEDURE — 87389 HIV-1 AG W/HIV-1&-2 AB AG IA: CPT | Performed by: NURSE PRACTITIONER

## 2023-07-28 PROCEDURE — 80053 COMPREHEN METABOLIC PANEL: CPT | Performed by: NURSE PRACTITIONER

## 2023-07-28 PROCEDURE — 86803 HEPATITIS C AB TEST: CPT | Performed by: NURSE PRACTITIONER

## 2023-07-28 PROCEDURE — 99396 PREV VISIT EST AGE 40-64: CPT | Performed by: NURSE PRACTITIONER

## 2023-07-28 PROCEDURE — 36415 COLL VENOUS BLD VENIPUNCTURE: CPT | Performed by: NURSE PRACTITIONER

## 2023-07-28 PROCEDURE — 99214 OFFICE O/P EST MOD 30 MIN: CPT | Mod: 25 | Performed by: NURSE PRACTITIONER

## 2023-07-28 RX ORDER — HYDROCHLOROTHIAZIDE 12.5 MG/1
12.5 TABLET ORAL DAILY
Qty: 90 TABLET | Refills: 1 | Status: CANCELLED | OUTPATIENT
Start: 2023-07-28

## 2023-07-28 RX ORDER — LISINOPRIL 30 MG/1
30 TABLET ORAL DAILY
Qty: 90 TABLET | Refills: 0 | Status: CANCELLED | OUTPATIENT
Start: 2023-07-28

## 2023-07-28 RX ORDER — HYDROCHLOROTHIAZIDE 25 MG/1
25 TABLET ORAL DAILY
Qty: 90 TABLET | Refills: 3 | Status: SHIPPED | OUTPATIENT
Start: 2023-07-28 | End: 2024-03-27

## 2023-07-28 RX ORDER — LISINOPRIL 20 MG/1
20 TABLET ORAL 2 TIMES DAILY
Qty: 180 TABLET | Refills: 3 | Status: SHIPPED | OUTPATIENT
Start: 2023-07-28 | End: 2024-03-27

## 2023-07-28 ASSESSMENT — ENCOUNTER SYMPTOMS
FEVER: 0
HEADACHES: 0
DYSURIA: 0
HEMATOCHEZIA: 0
FREQUENCY: 0
MYALGIAS: 0
CONSTIPATION: 0
DIZZINESS: 0
NERVOUS/ANXIOUS: 0
CHILLS: 0
HEARTBURN: 0
HEMATURIA: 0
WEAKNESS: 0
DIARRHEA: 0
PARESTHESIAS: 0
EYE PAIN: 0
ABDOMINAL PAIN: 0
PALPITATIONS: 0
NAUSEA: 0
ARTHRALGIAS: 0
SHORTNESS OF BREATH: 0
JOINT SWELLING: 0
COUGH: 0
SORE THROAT: 0

## 2023-07-28 NOTE — PROGRESS NOTES
SUBJECTIVE:   CC: Julius is an 43 year old who presents for preventative health visit.       7/28/2023     8:44 AM   Additional Questions   Roomed by Maye CMA   Accompanied by Self       Healthy Habits:     Getting at least 3 servings of Calcium per day:  NO    Bi-annual eye exam:  Yes    Dental care twice a year:  NO    Sleep apnea or symptoms of sleep apnea:  None    Diet:  Regular (no restrictions)    Frequency of exercise:  1 day/week    Duration of exercise:  Less than 15 minutes    Taking medications regularly:  Yes    Medication side effects:  None    Additional concerns today:  No      Hypertension Follow-up      Do you check your blood pressure regularly outside of the clinic? No     Are you following a low salt diet? Yes - Trying    Are your blood pressures ever more than 140 on the top number (systolic) OR more   than 90 on the bottom number (diastolic), for example 140/90? N/A    Have you ever done Advance Care Planning? (For example, a Health Directive, POLST, or a discussion with a medical provider or your loved ones about your wishes): Declined        Wt Readings from Last 5 Encounters:   08/25/23 (!) 168.7 kg (372 lb)   08/15/23 (!) 168.7 kg (372 lb)   08/04/23 (!) 168.7 kg (372 lb)   08/01/23 (!) 168.3 kg (371 lb)   07/28/23 (!) 168.3 kg (371 lb)      230 weight 5-6 years ago.   long hours sitting.      Does DOT physicals.     Smoking has tried quit.     Social History     Tobacco Use     Smoking status: Every Day     Packs/day: 0.25     Years: 26.00     Pack years: 6.50     Types: Cigarettes     Start date: 5/1/1996     Smokeless tobacco: Never   Substance Use Topics     Alcohol use: Yes     Alcohol/week: 1.0 standard drink of alcohol     Types: 1 Cans of beer per week           7/28/2023     8:43 AM   Alcohol Use   Prescreen: >3 drinks/day or >7 drinks/week? No       Last PSA: No results found for: PSA    Reviewed orders with patient. Reviewed health maintenance and updated orders  accordingly - No  Lab work is in process  Labs reviewed in EPIC  BP Readings from Last 3 Encounters:   08/25/23 (!) 130/90   08/24/23 129/84   08/11/23 115/80    Wt Readings from Last 3 Encounters:   08/25/23 (!) 168.7 kg (372 lb)   08/15/23 (!) 168.7 kg (372 lb)   08/04/23 (!) 168.7 kg (372 lb)                  Patient Active Problem List   Diagnosis     Class 3 severe obesity due to excess calories with serious comorbidity and body mass index (BMI) of 50.0 to 59.9 in adult (H)     Hypertension goal BP (blood pressure) < 130/80     Diabetes mellitus, type 2 (H)     History reviewed. No pertinent surgical history.    Social History     Tobacco Use     Smoking status: Every Day     Packs/day: 0.25     Years: 26.00     Pack years: 6.50     Types: Cigarettes     Start date: 5/1/1996     Smokeless tobacco: Never   Substance Use Topics     Alcohol use: Yes     Alcohol/week: 1.0 standard drink of alcohol     Types: 1 Cans of beer per week     Family History   Problem Relation Age of Onset     Obesity Mother      Obesity Father      Obesity Sister      Obesity Brother          Current Outpatient Medications   Medication Sig Dispense Refill     hydrochlorothiazide (HYDRODIURIL) 25 MG tablet Take 1 tablet (25 mg) by mouth daily 90 tablet 3     lisinopril (ZESTRIL) 20 MG tablet Take 1 tablet (20 mg) by mouth 2 times daily 180 tablet 3     albuterol (PROAIR HFA/PROVENTIL HFA/VENTOLIN HFA) 108 (90 Base) MCG/ACT inhaler Inhale 2 puffs into the lungs every 4 hours as needed for shortness of breath or wheezing 18 g 3     blood glucose (NO BRAND SPECIFIED) test strip Use to test blood sugar 1 times daily or as directed. To accompany: Blood Glucose Monitor Brands: per insurance. 100 strip 6     blood glucose monitoring (NO BRAND SPECIFIED) meter device kit Use to test blood sugar 1 times daily or as directed. Preferred blood glucose meter OR supplies to accompany: Blood Glucose Monitor Brands: per insurance. 1 kit 0     metFORMIN  "(GLUCOPHAGE XR) 500 MG 24 hr tablet Take 1 tablet (500 mg) by mouth 2 times daily (with meals) Take only once daily with evening meal for 1-2 weeks before increasing dose 180 tablet 1     thin (NO BRAND SPECIFIED) lancets Use with lanceting device. To accompany: Blood Glucose Monitor Brands: per insurance. 100 each 6     No Known Allergies    Reviewed and updated as needed this visit by clinical staff   Tobacco  Allergies  Meds  Problems  Med Hx  Surg Hx  Fam Hx          Reviewed and updated as needed this visit by Provider   Tobacco  Allergies  Meds  Problems  Med Hx  Surg Hx  Fam Hx           Review of Systems   Constitutional:  Negative for chills and fever.   HENT:  Negative for congestion, ear pain, hearing loss and sore throat.    Eyes:  Negative for pain and visual disturbance.   Respiratory:  Negative for cough and shortness of breath.    Cardiovascular:  Negative for chest pain, palpitations and peripheral edema.   Gastrointestinal:  Negative for abdominal pain, constipation, diarrhea, heartburn, hematochezia and nausea.   Genitourinary:  Negative for dysuria, frequency, genital sores, hematuria and urgency.   Musculoskeletal:  Negative for arthralgias, joint swelling and myalgias.   Skin:  Negative for rash.   Neurological:  Negative for dizziness, weakness, headaches and paresthesias.   Psychiatric/Behavioral:  Negative for mood changes. The patient is not nervous/anxious.        OBJECTIVE:   BP (!) 136/90   Pulse 115   Temp 98.4  F (36.9  C) (Tympanic)   Resp 15   Ht 1.803 m (5' 11\")   Wt (!) 168.3 kg (371 lb)   SpO2 98%   BMI 51.74 kg/m      Physical Exam  GENERAL: healthy, alert and no distress, very pleasant  EYES: Eyes grossly normal to inspection, PERRL and conjunctivae and sclerae normal  HENT: ear canals and TM's normal, nose and mouth without ulcers or lesions  NECK: no adenopathy, no asymmetry, masses, or scars and thyroid normal to palpation  RESP: lungs clear to " auscultation - no rales, rhonchi or wheezes  CV: regular rate and rhythm, normal S1 S2, no S3 or S4, no murmur, click or rub, no peripheral edema and peripheral pulses strong  ABDOMEN: soft, nontender, no hepatosplenomegaly, no masses and bowel sounds normal  MS: no gross musculoskeletal defects noted, no edema  SKIN: no suspicious lesions or rashes  NEURO: Normal strength and tone, mentation intact and speech normal  PSYCH: mentation appears normal, affect normal/bright      ASSESSMENT/PLAN:   Julius was seen today for physical.    Diagnoses and all orders for this visit:    Routine general medical examination at a health care facility  Wellness exam completed today.    Fasting labs today.    Will notify of lab results.  -     REVIEW OF HEALTH MAINTENANCE PROTOCOL ORDERS    Hypertension goal BP (blood pressure) < 130/80  Echo.   Not under good control. Increase hydrochlorothiazide and lisinopril.  Recheck BP in 2 weeks.   Labs also in two weeks.   Julius verbalizes understanding of plan of care and is in agreement.   -     Echocardiogram Complete; Future  -     lisinopril (ZESTRIL) 20 MG tablet; Take 1 tablet (20 mg) by mouth 2 times daily  -     hydrochlorothiazide (HYDRODIURIL) 25 MG tablet; Take 1 tablet (25 mg) by mouth daily  -     Comprehensive metabolic panel (BMP + Alb, Alk Phos, ALT, AST, Total. Bili, TP); Future  -     Albumin Random Urine Quantitative with Creat Ratio; Future  -     Adult Comprehensive Weight Management  Referral; Future  -     Comprehensive metabolic panel (BMP + Alb, Alk Phos, ALT, AST, Total. Bili, TP)  -     Cancel: Albumin Random Urine Quantitative with Creat Ratio    Class 3 severe obesity due to excess calories with serious comorbidity and body mass index (BMI) of 50.0 to 59.9 in adult (H)  Goal of weight loss for overall health improvements.  Refill to weight management.   Julius verbalizes understanding of plan of care and is in agreement.   -     Hemoglobin A1c;  "Future  -     Adult Comprehensive Weight Management  Referral; Future  -     Hemoglobin A1c    Tachycardia  -     Echocardiogram Complete; Future    Screening for HIV (human immunodeficiency virus)  -     HIV Antigen Antibody Combo; Future  -     HIV Antigen Antibody Combo    Need for hepatitis C screening test  -     Hepatitis C Screen Reflex to HCV RNA Quant and Genotype; Future  -     Hepatitis C Screen Reflex to HCV RNA Quant and Genotype    Screening for thyroid disorder  -     TSH with free T4 reflex; Future  -     TSH with free T4 reflex    Screening for deficiency anemia  -     CBC with platelets; Future  -     CBC with platelets    Encounter for smoking cessation counseling  Declines.     Screening for hyperlipidemia  -     Lipid panel reflex to direct LDL Non-fasting; Future  -     Lipid panel reflex to direct LDL Non-fasting        Patient has been advised of split billing requirements and indicates understanding: Yes      COUNSELING:   Reviewed preventive health counseling, as reflected in patient instructions      BMI:   Estimated body mass index is 51.74 kg/m  as calculated from the following:    Height as of this encounter: 1.803 m (5' 11\").    Weight as of this encounter: 168.3 kg (371 lb).   Weight management plan: Specific weight management program called [place] weight management discussed      He reports that he has been smoking cigarettes. He started smoking about 27 years ago. He has a 6.50 pack-year smoking history. He has never used smokeless tobacco.  Nicotine/Tobacco Cessation Plan:   Information offered: Patient not interested at this time              KRISTIN Mayes CNP  [facility] [place]  "

## 2023-07-31 LAB
HCV AB SERPL QL IA: NONREACTIVE
HIV 1+2 AB+HIV1 P24 AG SERPL QL IA: NONREACTIVE

## 2023-08-01 ENCOUNTER — OFFICE VISIT (OUTPATIENT)
Dept: ORTHOPEDICS | Facility: CLINIC | Age: 43
End: 2023-08-01
Payer: OTHER MISCELLANEOUS

## 2023-08-01 ENCOUNTER — ANCILLARY PROCEDURE (OUTPATIENT)
Dept: GENERAL RADIOLOGY | Facility: CLINIC | Age: 43
End: 2023-08-01
Attending: FAMILY MEDICINE
Payer: OTHER MISCELLANEOUS

## 2023-08-01 VITALS — BODY MASS INDEX: 44.1 KG/M2 | HEIGHT: 71 IN | WEIGHT: 315 LBS

## 2023-08-01 DIAGNOSIS — S52.571D OTHER CLOSED INTRA-ARTICULAR FRACTURE OF DISTAL END OF RIGHT RADIUS WITH ROUTINE HEALING, SUBSEQUENT ENCOUNTER: Primary | ICD-10-CM

## 2023-08-01 DIAGNOSIS — S52.551A OTHER CLOSED EXTRA-ARTICULAR FRACTURE OF DISTAL END OF RIGHT RADIUS, INITIAL ENCOUNTER: ICD-10-CM

## 2023-08-01 PROCEDURE — 99207 PR FRACTURE CARE IN GLOBAL PERIOD: CPT | Performed by: FAMILY MEDICINE

## 2023-08-01 PROCEDURE — 73110 X-RAY EXAM OF WRIST: CPT | Mod: RT | Performed by: FAMILY MEDICINE

## 2023-08-01 PROCEDURE — 29075 APPL CST ELBW FNGR SHORT ARM: CPT | Mod: 58

## 2023-08-01 PROCEDURE — 99207 PR FRACTURE CARE IN GLOBAL PERIOD: CPT

## 2023-08-01 NOTE — RESULT ENCOUNTER NOTE
Note to Staff: please call the patient to explain results.    Cholesterol has mild increase in LDL and also low HDL putting a higher risk of heart attack or stroke.  Glucose and A1c are elevated and he is now diabetic.This is a new diagnoses.  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  Please have him set up an in person office visit to go over treatment for diabetes and do a diabetic foot exam.      LOYD Mayes-BC

## 2023-08-01 NOTE — PROGRESS NOTES
"ASSESSMENT & PLAN  Patient Instructions     1. Other closed intra-articular fracture of distal end of right radius with routine healing, subsequent encounter      -Patient is following up for right wrist pain due to a distal radial intra-articular fracture  -X-rays taken in office today show slight increase in displacement of the fracture site.  -Patient was cut out of short arm cast and a new cast was placed  -We will continue to be held out of work as a  since he has no light duty options  -Patient will follow up on 8/15/2023 to be cut out of cast and repeat x-rays taken  -Call direct clinic number [[phone]] at any time with questions or concerns.    Albert Yeo MD [facility] Orthopedics and Sports Medicine  [facility]        -----    SUBJECTIVE:  Julius Zuniga is a 43 year old male who is seen in follow-up for right wrist pain due to injury on 7/16/23 - 16 days ago. They were last seen 7/17/2023 and placed in a short arm cast.     Since their last visit reports he still has pain with pronation / supination. They indicate that their current pain level is 5/10. They have tried rest/activity avoidance and casting/splinting/bracing.      The patient is seen by themselves.    Patient's past medical, surgical, social, and family histories were reviewed today and no changes are noted.    REVIEW OF SYSTEMS:  Constitutional: NEGATIVE for fever, chills, change in weight  Skin: NEGATIVE for worrisome rashes, moles or lesions  GI/: NEGATIVE for bowel or bladder changes  Neuro: NEGATIVE for weakness, dizziness or paresthesias    OBJECTIVE:  Ht 1.803 m (5' 11\")   Wt (!) 168.3 kg (371 lb)   BMI 51.74 kg/m     General: healthy, alert and in no distress  HEENT: no scleral icterus or conjunctival erythema  Skin: no suspicious lesions or rash. No jaundice.  CV: regular rhythm by palpation, no pedal edema  Resp: normal respiratory effort without conversational dyspnea   Psych: normal mood and " affect  Gait: normal steady gait with appropriate coordination and balance  Neuro: normal light touch sensory exam of the extremities.    MSK:  RIGHT WRIST  Inspection:    No swelling or obvious deformity or asymmetry  Palpation:    Tender about the distal radius and distal ulna. Remainder of bony and ligamentous line marks are nontender.     Metacarpals: normal    Thumb: normal    Fingers: normal  Range of Motion:    ROM (active and passive) limited in all planes secondary to pain  Strength:  Limited in all directions due to pain. Normal pinch strength.  Special Tests:    Positive: None      Independent visualization of the below image:  Recent Results (from the past 24 hour(s))   XR Wrist Right G/E 3 Views    Narrative    Longitudinal nondisplaced distal radial fracture extending into the   radiocarpal joint with mildly increased dorsal displacement seen on   lateral view compared to previous exam performed on 7/16/2023.       Cast/splint application    Date/Time: 8/1/2023 10:30 AM    Performed by: Alisha Sotelo ATC  Authorized by: Yeo, Albert, MD    Consent:     Consent obtained:  Verbal    Consent given by:  Patient    Risks discussed:  Discoloration, numbness, pain and swelling  Pre-procedure details:     Sensation:  Normal  Procedure details:     Laterality:  Right    Location:  Wrist    Wrist:  R wrist    Strapping: no      Cast type:  Short arm    Supplies:  Fiberglass  Post-procedure details:     Pain:  Improved    Sensation:  Normal    Patient tolerance of procedure:  Tolerated well, no immediate complications    Patient provided with cast or splint care instructions: Yes            Albert Yeo MD, [facility]

## 2023-08-01 NOTE — PATIENT INSTRUCTIONS
1. Other closed intra-articular fracture of distal end of right radius with routine healing, subsequent encounter      -Patient is following up for right wrist pain due to a distal radial intra-articular fracture  -X-rays taken in office today show slight increase in displacement of the fracture site.  -Patient was cut out of short arm cast and a new cast was placed  -We will continue to be held out of work as a  since he has no light duty options  -Patient will follow up on 8/15/2023 to be cut out of cast and repeat x-rays taken  -Call direct clinic number [[phone]] at any time with questions or concerns.    Albert Yeo MD [facility]  [facility] [place]

## 2023-08-01 NOTE — LETTER
August 1, 2023      Julius Zuniga  [address] [facility] [address]        To Whom It May Concern:    Julius Zuniga  was seen on 8/1/123.  Please excuse him from work until 8/16/23 due to injury.        Sincerely,        Albert Yeo, MD

## 2023-08-01 NOTE — LETTER
"    8/1/2023         RE: Julius Zuniga  [address] [facility] [address]        Dear Colleague,    Thank you for referring your patient, Julius Zuniga, to the [facility] [place]. Please see a copy of my visit note below.    ASSESSMENT & PLAN  Patient Instructions     1. Other closed intra-articular fracture of distal end of right radius with routine healing, subsequent encounter      -Patient is following up for right wrist pain due to a distal radial intra-articular fracture  -X-rays taken in office today show slight increase in displacement of the fracture site.  -Patient was cut out of short arm cast and a new cast was placed  -We will continue to be held out of work as a  since he has no light duty options  -Patient will follow up on 8/15/2023 to be cut out of cast and repeat x-rays taken  -Call direct clinic number [[phone]] at any time with questions or concerns.    Albert Yeo MD [facility] Orthopedics and Sports Medicine  [facility]        -----    SUBJECTIVE:  Julius Zuniga is a 43 year old male who is seen in follow-up for right wrist pain due to injury on 7/16/23 - 16 days ago. They were last seen 7/17/2023 and placed in a short arm cast.     Since their last visit reports he still has pain with pronation / supination. They indicate that their current pain level is 5/10. They have tried rest/activity avoidance and casting/splinting/bracing.      The patient is seen by themselves.    Patient's past medical, surgical, social, and family histories were reviewed today and no changes are noted.    REVIEW OF SYSTEMS:  Constitutional: NEGATIVE for fever, chills, change in weight  Skin: NEGATIVE for worrisome rashes, moles or lesions  GI/: NEGATIVE for bowel or bladder changes  Neuro: NEGATIVE for weakness, dizziness or paresthesias    OBJECTIVE:  Ht 1.803 m (5' 11\")   Wt (!) 168.3 kg (371 lb)   BMI 51.74 kg/m     General: " healthy, alert and in no distress  HEENT: no scleral icterus or conjunctival erythema  Skin: no suspicious lesions or rash. No jaundice.  CV: regular rhythm by palpation, no pedal edema  Resp: normal respiratory effort without conversational dyspnea   Psych: normal mood and affect  Gait: normal steady gait with appropriate coordination and balance  Neuro: normal light touch sensory exam of the extremities.    MSK:  RIGHT WRIST  Inspection:    No swelling or obvious deformity or asymmetry  Palpation:    Tender about the distal radius and distal ulna. Remainder of bony and ligamentous line marks are nontender.     Metacarpals: normal    Thumb: normal    Fingers: normal  Range of Motion:    ROM (active and passive) limited in all planes secondary to pain  Strength:  Limited in all directions due to pain. Normal pinch strength.  Special Tests:    Positive: None      Independent visualization of the below image:  Recent Results (from the past 24 hour(s))   XR Wrist Right G/E 3 Views    Narrative    Longitudinal nondisplaced distal radial fracture extending into the   radiocarpal joint with mildly increased dorsal displacement seen on   lateral view compared to previous exam performed on 7/16/2023.       Cast/splint application    Date/Time: 8/1/2023 10:30 AM    Performed by: Alisha Sotelo, ATC  Authorized by: Yeo, Albert, MD    Consent:     Consent obtained:  Verbal    Consent given by:  Patient    Risks discussed:  Discoloration, numbness, pain and swelling  Pre-procedure details:     Sensation:  Normal  Procedure details:     Laterality:  Right    Location:  Wrist    Wrist:  R wrist    Strapping: no      Cast type:  Short arm    Supplies:  Fiberglass  Post-procedure details:     Pain:  Improved    Sensation:  Normal    Patient tolerance of procedure:  Tolerated well, no immediate complications    Patient provided with cast or splint care instructions: Yes            Albert Yeo MD, [facility]        Again, thank you for allowing me to participate in the care of your patient.        Sincerely,        Albert Yeo, MD

## 2023-08-04 ENCOUNTER — OFFICE VISIT (OUTPATIENT)
Dept: FAMILY MEDICINE | Facility: CLINIC | Age: 43
End: 2023-08-04
Payer: COMMERCIAL

## 2023-08-04 VITALS
DIASTOLIC BLOOD PRESSURE: 86 MMHG | HEART RATE: 118 BPM | OXYGEN SATURATION: 96 % | BODY MASS INDEX: 44.1 KG/M2 | SYSTOLIC BLOOD PRESSURE: 152 MMHG | RESPIRATION RATE: 15 BRPM | TEMPERATURE: 97.7 F | HEIGHT: 71 IN | WEIGHT: 315 LBS

## 2023-08-04 DIAGNOSIS — E78.5 HYPERLIPIDEMIA LDL GOAL <100: ICD-10-CM

## 2023-08-04 DIAGNOSIS — E11.65 TYPE 2 DIABETES MELLITUS WITH HYPERGLYCEMIA, WITHOUT LONG-TERM CURRENT USE OF INSULIN (H): Primary | ICD-10-CM

## 2023-08-04 DIAGNOSIS — E66.813 CLASS 3 SEVERE OBESITY DUE TO EXCESS CALORIES WITH SERIOUS COMORBIDITY AND BODY MASS INDEX (BMI) OF 50.0 TO 59.9 IN ADULT (H): ICD-10-CM

## 2023-08-04 DIAGNOSIS — I10 HYPERTENSION GOAL BP (BLOOD PRESSURE) < 130/80: ICD-10-CM

## 2023-08-04 DIAGNOSIS — E66.01 CLASS 3 SEVERE OBESITY DUE TO EXCESS CALORIES WITH SERIOUS COMORBIDITY AND BODY MASS INDEX (BMI) OF 50.0 TO 59.9 IN ADULT (H): ICD-10-CM

## 2023-08-04 PROBLEM — E11.9 DIABETES MELLITUS, TYPE 2 (H): Status: ACTIVE | Noted: 2023-08-04

## 2023-08-04 PROCEDURE — 99214 OFFICE O/P EST MOD 30 MIN: CPT | Performed by: NURSE PRACTITIONER

## 2023-08-04 PROCEDURE — 99207 PR FOOT EXAM NO CHARGE: CPT | Performed by: NURSE PRACTITIONER

## 2023-08-04 RX ORDER — LANCETS
EACH MISCELLANEOUS
Qty: 100 EACH | Refills: 6 | Status: SHIPPED | OUTPATIENT
Start: 2023-08-04

## 2023-08-04 RX ORDER — METFORMIN HCL 500 MG
500 TABLET, EXTENDED RELEASE 24 HR ORAL 2 TIMES DAILY WITH MEALS
Qty: 180 TABLET | Refills: 1 | Status: SHIPPED | OUTPATIENT
Start: 2023-08-04 | End: 2023-11-06

## 2023-08-04 NOTE — PROGRESS NOTES
Assessment & Plan     Type 2 diabetes mellitus with hyperglycemia, without long-term current use of insulin (H)  Class 3 severe obesity due to excess calories with serious comorbidity and body mass index (BMI) of 50.0 to 59.9 in adult (H)  Hypertension goal BP (blood pressure) < 130/80  Hyperlipidemia LDL goal <100  Acknowledged and discussed his concerns and frustrations.    Discussed that he can have significant improvement of his diabetes and multiple comorbidities with weight loss.  Referral has already been sent to weight management clinic.  I have given him this number today to reach out if he does not hear from them.  He is to start diabetic treatment today.  Metformin ER slow titration up to 500 mg twice daily with ultimate goal of 1000 mg twice daily.  Check sugars daily once daily-supplies sent.  Diabetic education as soon as possible to help also.    [place] decision was to wait on starting statin medication so he does not have so many meds at once but ultimately he should be on a low-dose statin to also decrease his LDL at least below 100.  He is at high risk for heart attack or stroke at 15% given his weight, hypertension, hyperlipidemia, and diabetes.  He is currently out of work due to work comp injury but I did discuss with him that he should still be able to drive with everything under good control.  I do recommend my colleague Dr. Webb here with our clinic for his upcoming DOT physical so we can keep all his paperwork at 1 spot.  Dr. Webb and myself will work hard to get him at a good place with his health and continue to keep  him driving.  If he would like to discuss mood and potential start of medications for this that is certainly reasonable and I encourage him to do so.    Follow-up in 3 months in clinic for exam and recheck of A1c and likely start a statin medication at that time.  Julius verbalizes understanding of plan of care and is in agreement.      - FOOT EXAM  - AMB Adult Diabetes  "Educator Referral  - metFORMIN (GLUCOPHAGE XR) 500 MG 24 hr tablet  Dispense: 180 tablet; Refill: 1  - blood glucose monitoring (NO BRAND SPECIFIED) meter device kit  Dispense: 1 kit; Refill: 0  - thin (NO BRAND SPECIFIED) lancets  Dispense: 100 each; Refill: 6  - blood glucose (NO BRAND SPECIFIED) test strip  Dispense: 100 strip; Refill: 6             BMI:   Estimated body mass index is 51.88 kg/m  as calculated from the following:    Height as of this encounter: 1.803 m (5' 11\").    Weight as of this encounter: 168.7 kg (372 lb).   Weight management plan: Specific weight management program called   discussed      Return in about 3 months (around 11/4/2023) for Medication recheck; DOT Dr. Webb in December.      Jennifer Walker [facility]   Julius is a 43 year old, presenting for the following health issues:  Diabetes        8/4/2023     8:15 AM   Additional Questions   Roomed by Maye HORNE   Accompanied by Self       History of Present Illness       Diabetes:   He presents for follow up of diabetes.    He is not checking blood glucose.        He is concerned about other.    He is not experiencing numbness or burning in feet, excessive thirst, blurry vision, weight changes or redness, sores or blisters on feet.           He eats 2-3 servings of fruits and vegetables daily.He consumes 1 sweetened beverage(s) daily.He exercises with enough effort to increase his heart rate 9 or less minutes per day.  He exercises with enough effort to increase his heart rate 3 or less days per week.   He is taking medications regularly.       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    He was referred to weight management clinic at his recent physical and yet had an appointment with them.  Experiences no neuropathy symptoms.  No change in vision.    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  He is considering doing DOT physicals here with our clinic in the future to have his records all in one place.  Currently has fractured right arm/work comp injury that is healing. He currently has the inability to work at this time as well as inability to ride his motorcycle which is frustrating as he was to go to [place] this weekend.  Mood has been labile quarles angered and frustrating with all of this going on.      Wt Readings from Last 5 Encounters:   08/04/23 (!) 168.7 kg (372 lb)   08/01/23 (!) 168.3 kg (371 lb)   07/28/23 (!) 168.3 kg (371 lb)   07/17/23 (!) 167.8 kg (370 lb)   07/17/23 (!) 167.8 kg (370 lb)      Hemoglobin A1C   Date Value Ref Range Status   07/28/2023 7.5 (H) 0.0 - 5.6 % Final     Comment:     Normal <5.7%   Prediabetes 5.7-6.4%    Diabetes 6.5% or higher     Note: Adopted from ADA consensus guidelines.      LDL Cholesterol Calculated   Date Value Ref Range Status   07/28/2023 122 (H) <=100 mg/dL Final        BP Readings from Last 6 Encounters:   08/04/23 (!) 152/86   07/28/23 (!) 136/90   07/17/23 (!) 140/90   07/17/23 (!) 140/90   07/16/23 138/75   03/24/23 139/82        The 10-year ASCVD risk score (Rajiv DA ISLVA, et al., 2019) is: 15.1%    Values used to calculate the score:      Age: 43 years      Sex: Male      Is Non- : No      Diabetic: Yes      Tobacco smoker: Yes      Systolic Blood Pressure: 152 mmHg      Is BP treated: Yes      HDL Cholesterol: 38 mg/dL      Total Cholesterol: 180 mg/dL     Review of Systems   Constitutional, HEENT, cardiovascular, pulmonary, GI, , musculoskeletal, neuro, skin, endocrine and psych systems are negative, except as otherwise noted in the HPI.       Objective    BP (!) 152/86 (BP Location: Left arm, Patient Position: Chair, Cuff Size: Adult Large)   Pulse 118   Temp 97.7  F (36.5  C)  "(Tympanic)   Resp 15   Ht 1.803 m (5' 11\")   Wt (!) 168.7 kg (372 lb)   SpO2 96%   BMI 51.88 kg/m    Body mass index is 51.88 kg/m .  Physical Exam   GENERAL: healthy, alert and no distress  MS: no gross musculoskeletal defects noted, no edema  SKIN: no suspicious lesions or rashes  PSYCH: mentation appears normal, affect normal/bright  Diabetic foot exam: normal DP and PT pulses, no trophic changes or ulcerative lesions, normal sensory exam, venous stasis dermatitis noted, dry cracking heels, and onychomycosis            "

## 2023-08-04 NOTE — PATIENT INSTRUCTIONS
Fasting (before any food) blood sugar daily if able.     Can also check blood sugar in the day 2 hours after eating

## 2023-08-11 ENCOUNTER — ALLIED HEALTH/NURSE VISIT (OUTPATIENT)
Dept: FAMILY MEDICINE | Facility: CLINIC | Age: 43
End: 2023-08-11
Payer: COMMERCIAL

## 2023-08-11 VITALS — DIASTOLIC BLOOD PRESSURE: 80 MMHG | SYSTOLIC BLOOD PRESSURE: 115 MMHG

## 2023-08-11 DIAGNOSIS — R06.2 WHEEZING: ICD-10-CM

## 2023-08-11 DIAGNOSIS — I10 HYPERTENSION GOAL BP (BLOOD PRESSURE) < 130/80: Primary | ICD-10-CM

## 2023-08-11 PROCEDURE — 99207 PR NON-BILLABLE SERV PER CHARTING: CPT | Performed by: NURSE PRACTITIONER

## 2023-08-11 RX ORDER — ALBUTEROL SULFATE 90 UG/1
2 AEROSOL, METERED RESPIRATORY (INHALATION) EVERY 4 HOURS PRN
Qty: 18 G | Refills: 3 | Status: SHIPPED | OUTPATIENT
Start: 2023-08-11 | End: 2024-03-27

## 2023-08-11 RX ORDER — ALBUTEROL SULFATE 90 UG/1
AEROSOL, METERED RESPIRATORY (INHALATION)
Qty: 8.5 G | Refills: 1 | Status: SHIPPED | OUTPATIENT
Start: 2023-08-11 | End: 2023-08-11

## 2023-08-11 NOTE — PROGRESS NOTES
Julius Zuniga was evaluated at [facility] on August 11, 2023 at which time his blood pressure was:    BP Readings from Last 1 Encounters:   08/11/23 115/80     No data recorded      Reviewed lifestyle modifications for blood pressure control and reduction: including making healthy food choices, managing weight, getting regular exercise, smoking cessation, reducing alcohol consumption, monitoring blood pressure regularly.     Symptoms: None    BP Goal:Other: 130/80    BP Assessment:  BP too high    Potential Reasons for BP too high: Unknown/Other: recent  dose change     BP Follow-Up Plan: Recheck BP in 30 days at pharmacy    Recommendation to Provider: Rescheuled for 2 weeks out.  Bp is very close to goal.  He will be returning to work next week (after cast is removed) and will likely be more active.      Note completed by: Jennifer Bishop RPH

## 2023-08-11 NOTE — TELEPHONE ENCOUNTER
[facility] calls to clarify directions for albuterol inhaler prescription sent today. They need more specific directions for how often patient is able to use this, directions currently state to use as needed. Routed to provider to update prescription and resend to pharmacy. Pharmacy will cancel the prescription they received.     Lulu Garza RN  [facility]     62.6

## 2023-08-11 NOTE — Clinical Note
Recheck scheduled for 8/25/23.  Not at goal, so not signed by [facility].  Please close encounter once reviewed.

## 2023-08-15 ENCOUNTER — ANCILLARY PROCEDURE (OUTPATIENT)
Dept: GENERAL RADIOLOGY | Facility: CLINIC | Age: 43
End: 2023-08-15
Attending: FAMILY MEDICINE
Payer: OTHER MISCELLANEOUS

## 2023-08-15 ENCOUNTER — OFFICE VISIT (OUTPATIENT)
Dept: ORTHOPEDICS | Facility: CLINIC | Age: 43
End: 2023-08-15
Payer: OTHER MISCELLANEOUS

## 2023-08-15 VITALS — BODY MASS INDEX: 44.1 KG/M2 | WEIGHT: 315 LBS | HEIGHT: 71 IN

## 2023-08-15 DIAGNOSIS — S52.571D OTHER CLOSED INTRA-ARTICULAR FRACTURE OF DISTAL END OF RIGHT RADIUS WITH ROUTINE HEALING, SUBSEQUENT ENCOUNTER: ICD-10-CM

## 2023-08-15 DIAGNOSIS — S52.571D OTHER CLOSED INTRA-ARTICULAR FRACTURE OF DISTAL END OF RIGHT RADIUS WITH ROUTINE HEALING, SUBSEQUENT ENCOUNTER: Primary | ICD-10-CM

## 2023-08-15 PROCEDURE — 73110 X-RAY EXAM OF WRIST: CPT | Mod: RT | Performed by: FAMILY MEDICINE

## 2023-08-15 PROCEDURE — 99207 PR FRACTURE CARE IN GLOBAL PERIOD: CPT | Performed by: FAMILY MEDICINE

## 2023-08-15 NOTE — PROGRESS NOTES
"ASSESSMENT & PLAN  Patient Instructions     1. Other closed intra-articular fracture of distal end of right radius with routine healing, subsequent encounter      -Patient is following up for right wrist pain due to a distal radial fracture  -X-rays taken office today show slow progressive healing at the fracture site extending into the wrist joint  -Patient was cut out of short arm cast and will transition into a wrist brace  -Patient will begin gentle range of motion exercises.  Patient will not lift anything heavier than 5 pounds  -Patient will follow up on 8/25/2023 for repeat x-rays and evaluate potential return to work status  -Call direct clinic number [[phone]] at any time with questions or concerns.    Albert Yeo MD [facility] Orthopedics and Sports Medicine  [facility]        -----    SUBJECTIVE:  Julius Zuniga is a 43 year old male who is seen in follow-up for right wrist pain due to injury on 7/16/23 ~ 30 days ago. They were last seen 8/1/2023 and placed in a short arm cast.     Since their last visit reports that the wrist is still sore / painful. Cast appears intact and well fitting. They indicate that their current pain level is 4/10. They have tried rest/activity avoidance and casting/splinting/bracing.      The patient is seen by themselves.    Patient's past medical, surgical, social, and family histories were reviewed today and no changes are noted.    REVIEW OF SYSTEMS:  Constitutional: NEGATIVE for fever, chills, change in weight  Skin: NEGATIVE for worrisome rashes, moles or lesions  GI/: NEGATIVE for bowel or bladder changes  Neuro: NEGATIVE for weakness, dizziness or paresthesias    OBJECTIVE:  Ht 1.803 m (5' 11\")   Wt (!) 168.7 kg (372 lb)   BMI 51.88 kg/m     General: healthy, alert and in no distress  HEENT: no scleral icterus or conjunctival erythema  Skin: no suspicious lesions or rash. No jaundice.  CV: regular rhythm by palpation, no pedal " edema  Resp: normal respiratory effort without conversational dyspnea   Psych: normal mood and affect  Gait: normal steady gait with appropriate coordination and balance  Neuro: normal light touch sensory exam of the extremities.    MSK:  RIGHT WRIST  Inspection:    No swelling or obvious deformity or asymmetry  Palpation:    Mildly tender about the distal radius and distal ulna. Remainder of bony and ligamentous line marks are nontender.     Metacarpals: normal    Thumb: normal    Fingers: normal  Range of Motion:    ROM (active and passive) limited in all planes secondary to pain  Strength:  Limited in all directions due to pain. Normal pinch strength.  Special Tests:    Positive: None    Independent visualization of the below image:  Recent Results (from the past 24 hour(s))   XR Wrist Right G/E 3 Views    Narrative    Longitudinal nondisplaced distal radial fracture extending into the   radiocarpal joint persistent with mild callus formation present.          Albert Yeo MD, CAM  [place] [facility]

## 2023-08-15 NOTE — LETTER
August 15, 2023      Julius Zuniga  [address] [facility] [address]        To Whom It May Concern:    Julius Zuniga  was seen on 8/15/2023.  Please excuse him from work until 8/28/2023 due to injury.        Sincerely,        Albert Yeo, MD

## 2023-08-15 NOTE — LETTER
8/15/2023         RE: Julius Zuniga  [address] [facility] [address]        Dear Colleague,    Thank you for referring your patient, Julius Zuniga, to the [facility] [place]. Please see a copy of my visit note below.    ASSESSMENT & PLAN  Patient Instructions     1. Other closed intra-articular fracture of distal end of right radius with routine healing, subsequent encounter      -Patient is following up for right wrist pain due to a distal radial fracture  -X-rays taken office today show slow progressive healing at the fracture site extending into the wrist joint  -Patient was cut out of short arm cast and will transition into a wrist brace  -Patient will begin gentle range of motion exercises.  Patient will not lift anything heavier than 5 pounds  -Patient will follow up on 8/25/2023 for repeat x-rays and evaluate potential return to work status  -Call direct clinic number [[phone]] at any time with questions or concerns.    Albert Yeo MD [facility] Orthopedics and Sports Medicine  [facility]        -----    SUBJECTIVE:  Julius Zuniga is a 43 year old male who is seen in follow-up for right wrist pain due to injury on 7/16/23 ~ 30 days ago. They were last seen 8/1/2023 and placed in a short arm cast.     Since their last visit reports that the wrist is still sore / painful. Cast appears intact and well fitting. They indicate that their current pain level is 4/10. They have tried rest/activity avoidance and casting/splinting/bracing.      The patient is seen by themselves.    Patient's past medical, surgical, social, and family histories were reviewed today and no changes are noted.    REVIEW OF SYSTEMS:  Constitutional: NEGATIVE for fever, chills, change in weight  Skin: NEGATIVE for worrisome rashes, moles or lesions  GI/: NEGATIVE for bowel or bladder changes  Neuro: NEGATIVE for weakness, dizziness or  "paresthesias    OBJECTIVE:  Ht 1.803 m (5' 11\")   Wt (!) 168.7 kg (372 lb)   BMI 51.88 kg/m     General: healthy, alert and in no distress  HEENT: no scleral icterus or conjunctival erythema  Skin: no suspicious lesions or rash. No jaundice.  CV: regular rhythm by palpation, no pedal edema  Resp: normal respiratory effort without conversational dyspnea   Psych: normal mood and affect  Gait: normal steady gait with appropriate coordination and balance  Neuro: normal light touch sensory exam of the extremities.    MSK:  RIGHT WRIST  Inspection:    No swelling or obvious deformity or asymmetry  Palpation:    Mildly tender about the distal radius and distal ulna. Remainder of bony and ligamentous line marks are nontender.     Metacarpals: normal    Thumb: normal    Fingers: normal  Range of Motion:    ROM (active and passive) limited in all planes secondary to pain  Strength:  Limited in all directions due to pain. Normal pinch strength.  Special Tests:    Positive: None    Independent visualization of the below image:  Recent Results (from the past 24 hour(s))   XR Wrist Right G/E 3 Views    Narrative    Longitudinal nondisplaced distal radial fracture extending into the   radiocarpal joint persistent with mild callus formation present.          Albert Yeo MD, [facility]        Again, thank you for allowing me to participate in the care of your patient.        Sincerely,        Albert Yeo, MD  "

## 2023-08-15 NOTE — PATIENT INSTRUCTIONS
1. Other closed intra-articular fracture of distal end of right radius with routine healing, subsequent encounter      -Patient is following up for right wrist pain due to a distal radial fracture  -X-rays taken office today show slow progressive healing at the fracture site extending into the wrist joint  -Patient was cut out of short arm cast and will transition into a wrist brace  -Patient will begin gentle range of motion exercises.  Patient will not lift anything heavier than 5 pounds  -Patient will follow up on 8/25/2023 for repeat x-rays and evaluate potential return to work status  -Call direct clinic number [[phone]] at any time with questions or concerns.    Albert Yeo MD [facility]  [facility] [place]

## 2023-08-18 ENCOUNTER — VIRTUAL VISIT (OUTPATIENT)
Dept: EDUCATION SERVICES | Facility: CLINIC | Age: 43
End: 2023-08-18
Attending: NURSE PRACTITIONER
Payer: COMMERCIAL

## 2023-08-18 DIAGNOSIS — E11.65 TYPE 2 DIABETES MELLITUS WITH HYPERGLYCEMIA, WITHOUT LONG-TERM CURRENT USE OF INSULIN (H): ICD-10-CM

## 2023-08-18 PROCEDURE — G0108 DIAB MANAGE TRN  PER INDIV: HCPCS | Mod: VID | Performed by: NUTRITIONIST

## 2023-08-18 NOTE — PATIENT INSTRUCTIONS
Goals for Diabetes Care:    1. Eat 3 balanced meals each day - Monitor carb intake and aim for 45-60 grams per meal (3-4 carbohydrate choices)  Carbohydrate 1 choice = 15 grams    2. Check blood sugars at least 1 time each day at varying times   Blood Glucose Targets:   1. Fasting and before meal target is 80 - 130   2. 2 hours after a meal target is < 180  Always remember to bring meter and log book to all appointments.    **Call [employer] customer service about lancing device and monitor: #[phone]    3. Activity really helps improve blood sugars. Try to Incorporate 30 minutes activity into each day - does not need to be all at one time & walking counts!    4. Treating low BG. Rule of 15 = BG 50-70 mg/dL = 15 gram carb (4 oz juice, 4 glucose tabs, OR 4 oz pop), then recheck BG in 15 minutes. If still low repeat. (If BG <50 mg/dL = 8 oz pop/juice or 8 glucose tabs).    5. Take diabetes medications as prescribed   -Metformin  mg twice daily    Follow up with your Diabetic Educator to assess BG targets and need for modifications to medications and/or lifestyle.    Call with any questions.  Thank you!  Babita Clemons, RD, LD, [facility] [place]   Certified Diabetes Care &   [facility]  Triage [phone] or Scheduling [phone]

## 2023-08-18 NOTE — PROGRESS NOTES
Diabetes Self-Management Education & Support    Presents for: Initial Assessment for new diagnosis    Type of service:  Video Visit    If the video visit is dropped, the video visit invitation should be resent by: Text to cell phone: [phone]    Originating Location (pt. Location): Home  Distant Location (provider location): Offsite  Mode of Communication:  Video Conference via [employer]    Video Start Time:  8:25 AM  Video End Time (time video stopped): 9:18 AM    How would patient like to obtain AVS? MyChart    Assessment Type:   ASSESSMENT:  Julius is having problems with his monitor and lancing device, he has been only able to monitor blood sugar once since receiving kit.  He will call [employer] customer service to request new lancing device.  Review of diet shows that he has been following intermittent fasting 12/12 and reports 10 lbs weight loss over the past few weeks and has made a lot of changes to diet with healthier choices and portions.  He reports difficulty with any prolonged activity due to back issues but has started walking as much as he can stand.      Patient's most recent   Lab Results   Component Value Date    A1C 7.5 07/28/2023     is not meeting goal of <7.0    Diabetes knowledge and skills assessment:   Patient is knowledgeable in diabetes management concepts related to: Healthy Eating and Taking Medication    Continue education with the following diabetes management concepts: Being Active, Monitoring, Problem Solving, Reducing Risks, and Healthy Coping    Based on learning assessment above, most appropriate setting for further diabetes education would be: Individual setting.      PLAN  1) Consume 45-60 grams carbohydrate at each meal  2) Increase activity levels to walking > 30 minutes/day  3) Monitor once daily at varying times and have results for next appointment  4) Call [employer] service for lancing device replacement    Topics to cover at upcoming visits: Being Active,  "Monitoring, Problem Solving, Reducing Risks, and Healthy Coping    Follow-up: 9/15/23    See Care Plan for co-developed, patient-state behavior change goals.  AVS provided for patient today.    Education Materials Provided:  [employer] Healthy Living with Diabetes Book, Carbohydrate Counting, and My Plate Planner      SUBJECTIVE/OBJECTIVE:  Presents for: Initial Assessment for new diagnosis  Accompanied by: Self  Diabetes education in the past 24mo: No  Focus of Visit: Monitoring, Healthy Eating, Diabetes Pathophysiology  Diabetes type: Type 2  Date of diagnosis: 7/28/23  Difficulty affording diabetes medication?: Yes  Difficulty affording diabetes testing supplies?: No  Other concerns:: None  Cultural Influences/Ethnic Background:  Not  or       Diabetes Symptoms & Complications:  Fatigue: Yes  Neuropathy: No  Polydipsia: No  Polyphagia: No  Polyuria: No  Visual change: No  Slow healing wounds: No  Symptom course: Stable  Weight trend: Stable       Patient Problem List and Family Medical History reviewed for relevant medical history, current medical status, and diabetes risk factors.    Vitals:  There were no vitals taken for this visit.  Estimated body mass index is 51.88 kg/m  as calculated from the following:    Height as of 8/15/23: 1.803 m (5' 11\").    Weight as of 8/15/23: 168.7 kg (372 lb).   Last 3 BP:   BP Readings from Last 3 Encounters:   08/11/23 115/80   08/04/23 (!) 152/86   07/28/23 (!) 136/90       History   Smoking Status    Every Day    Packs/day: 0.25    Years: 26.00    Types: Cigarettes    Start date: 5/1/1996   Smokeless Tobacco    Never       Labs:  Lab Results   Component Value Date    A1C 7.5 07/28/2023     Lab Results   Component Value Date     07/28/2023     07/01/2022     Lab Results   Component Value Date     07/28/2023     Direct Measure HDL   Date Value Ref Range Status   07/28/2023 38 (L) >=40 mg/dL Final   ]  GFR Estimate   Date Value Ref " Range Status   07/28/2023 >90 >60 mL/min/1.73m2 Final     No results found for: GFRESTBLACK  Lab Results   Component Value Date    CR 0.87 07/28/2023     No results found for: MICROALBUMIN    Healthy Eating:  Healthy Eating Assessed Today: Yes  Cultural/Restorationist diet restrictions?: No  Meal planning/habits:  (intermittent fasting)  Meals include: Breakfast, Lunch  Breakfast: 8:30 AM 2 eggs and plain yogurt or protein shake (premier) + 1-2 pcs. whole grain toast  Lunch: 2 PM: Chicken taco at [employer] or [employer]  Dinner: 6-7PM: Protein Shake or Venison Burger without bun onion, lindsey, cheese  and asparagus/green beans  Beverages: Tea, Water  Has patient met with a dietitian in the past?: No    Being Active:  Being Active Assessed Today: Yes  Exercise:: Currently not exercising    Monitoring:  Monitoring Assessed Today: Yes  Blood Glucose Meter: [employer]  Times checking blood sugar at home (number): 1  Times checking blood sugar at home (per): Day    Having problems with lancing device has only monitored once since receiving it, pre lunch 135 mg/dl.    Taking Medications:  Diabetes Medication(s)       Biguanides       metFORMIN (GLUCOPHAGE XR) 500 MG 24 hr tablet    Take 1 tablet (500 mg) by mouth 2 times daily (with meals) Take only once daily with evening meal for 1-2 weeks before increasing dose            Taking Medication Assessed Today: Yes  Current Treatments: Oral Medication (taken by mouth)  Diabetes medication side effects?: No    Problem Solving:  Problem Solving Assessed Today: Yes  Is the patient at risk for hypoglycemia?: No  Is the patient at risk for DKA?: No  Does patient have severe weather/disaster plan for diabetes management?: No  Does patient have sick day plan for diabetes management?: No       Reducing Risks:  Reducing Risks Assessed Today: Yes  CAD Risks: Diabetes Mellitus, Hypertension, Male sex, Obesity  Has dilated eye exam at least once a year?: Yes  Sees dentist every 6 months?: No  Feet  checked by healthcare provider in the last year?: Yes    Healthy Coping:  Healthy Coping Assessed Today: Yes  Emotional response to diabetes: Fear/Anxiety  Informal Support system:: Friends  Patient Activation Measure Survey Score:      1/16/2023     8:33 AM   PEPITO Score (Last Two)   PEPITO Raw Score 35   Activation Score 72.1   PEPITO Level 3         Care Plan and Education Provided:  Care Plan: Diabetes   Updates made by Babita Rojas RD since 8/18/2023 12:00 AM        Problem: HbA1C Not In Goal         Goal: Establish Regular Follow-Ups with PCP         Task: Discuss with PCP the recommended timing for patient's next follow up visit(s)    Responsible User: Babita Rojas RD        Task: Discuss schedule for PCP visits with patient    Responsible User: Babita Rojas RD        Goal: Get HbA1C Level in Goal         Task: Educate patient on diabetes education self-management topics    Responsible User: Babita Rojas RD        Task: Educate patient on benefits of regular glucose monitoring    Responsible User: Babita Rojas RD        Task: Refer patient to appropriate extended care team member, as needed (Medication Therapy Management, Behavioral Health, Physical Therapy, etc.)    Responsible User: Babita Rojas RD        Task: Discuss diabetes treatment plan with patient    Responsible User: Babita Rojas RD        Problem: Diabetes Self-Management Education Needed to Optimize Self-Care Behaviors         Goal: Understand diabetes pathophysiology and disease progression         Task: Provide education on diabetes pathophysiology and disease progression specfic to patient's diabetes type Completed 8/18/2023   Responsible User: Babita Rojas RD        Goal: Healthy Eating - follow a healthy eating pattern for diabetes    This Visit's Progress: 50%   Note:    Consume 45-60 grams carbohydrate at each meal       Task: Provide education on managing carbohydrate intake (carbohydrate counting,  plate planning method, etc.) Completed 8/18/2023   Responsible User: Babita Rojas RD        Task: Provide education on weight management Completed 8/18/2023   Responsible User: Babita Rojas RD        Task: Provide education on heart healthy eating    Responsible User: Babita Rojas RD        Task: Provide education on eating out    Responsible User: Babita Rojas RD        Task: Develop individualized healthy eating plan with patient    Responsible User: Babita Rojas RD        Goal: Being Active - get regular physical activity, working up to at least 150 minutes per week         Task: Provide education on relationship of activity to glucose and precautions to take if at risk for low glucose Completed 8/18/2023   Responsible User: Babita Rojas RD        Task: Discuss barriers to physical activity with patient Completed 8/18/2023   Responsible User: Babita Rojas RD        Task: Develop physical activity plan with patient    Responsible User: Babita Rojas RD        Task: Explore community resources including walking groups, assistance programs, and home videos    Responsible User: Babita Rojas RD        Goal: Monitoring - monitor glucose and ketones as directed         Task: Provide education on blood glucose monitoring (purpose, proper technique, frequency, glucose targets, interpreting results, when to use glucose control solution, sharps disposal) Completed 8/18/2023   Responsible User: Babita Rojas RD        Task: Provide education on continuous glucose monitoring (sensor placement, use of russ or /reader, understanding glucose trends, alerts and alarms, differences between sensor glucose and blood glucose)    Responsible User: Babita Rojas RD        Task: Provide education on ketone monitoring (when to monitor, frequency, etc.)    Responsible User: Babita Rojas RD        Goal: Taking Medication - patient is consistently taking medications  as directed         Task: Provide education on action of prescribed medication, including when to take and possible side effects Completed 8/18/2023   Responsible User: Babita Rojas RD        Task: Provide education on insulin and injectable diabetes medications, including administration, storage, site selection and rotation for injection sites    Responsible User: Babita Rojas RD        Task: Discuss barriers to medication adherence with patient and provide management technique ideas as appropriate    Responsible User: Babita Rojas RD        Task: Provide education on frequency and refill details of medications    Responsible User: Babita Rojas RD        Goal: Problem Solving - know how to prevent and manage short-term diabetes complications         Task: Provide education on high blood glucose - causes, signs/symptoms, prevention and treatment Completed 8/18/2023   Responsible User: Babita Rojas RD        Task: Provide education on low blood glucose - causes, signs/symptoms, prevention, treatment, carrying a carbohydrate source at all times, and medical identification    Responsible User: Babita Rojas RD        Task: Provide education on safe travel with diabetes    Responsible User: Babita Rojas RD        Task: Provide education on how to care for diabetes on sick days    Responsible User: Babita Rojas RD        Task: Provide education on when to call a health care provider    Responsible User: Babita Rojas RD        Goal: Reducing Risks - know how to prevent and treat long-term diabetes complications         Task: Provide education on major complications of diabetes, prevention, early diagnostic measures and treatment of complications    Responsible User: Babita Rojas RD        Task: Provide education on recommended care for dental, eye and foot health    Responsible User: Babita Rojas RD        Task: Provide education on Hemoglobin A1c - goals  and relationship to blood glucose levels    Responsible User: Babita Rojas RD        Task: Provide education on recommendations for heart health - lipid levels and goals, blood pressure and goals, and aspirin therapy, if indicated    Responsible User: Babita Rojas RD        Task: Provide education on tobacco cessation    Responsible User: Babita Rojas RD        Goal: Healthy Coping - use available resources to cope with the challenges of managing diabetes         Task: Discuss recognizing feelings about having diabetes Completed 8/18/2023   Responsible User: Babita Rojas RD        Task: Provide education on the benefits of making appropriate lifestyle changes    Responsible User: Babita Rojas RD        Task: Provide education on benefits of utilizing support systems    Responsible User: Babita Rojas RD        Task: Discuss methods for coping with stress    Responsible User: Babita Rojas RD        Task: Provide education on when to seek professional counseling    Responsible User: Babita Rojas RD Michelle DiDio, RD, LD, [facility]     Time Spent: 52 minutes  Encounter Type: Individual    Any diabetes medication dose changes were made via the CDE Protocol per the patient's referring provider. A copy of this encounter was shared with the provider.

## 2023-08-18 NOTE — LETTER
8/18/2023         RE: Julius Zuniga  [address] [facility] [address]        Dear Colleague,    Thank you for referring your patient, Julius Zuniga, to the [facility]. Please see a copy of my visit note below.    Diabetes Self-Management Education & Support    Presents for: Initial Assessment for new diagnosis    Type of service:  Video Visit    If the video visit is dropped, the video visit invitation should be resent by: Text to cell phone: [phone]    Originating Location (pt. Location): Home  Distant Location (provider location): Offsite  Mode of Communication:  Video Conference via [employer]    Video Start Time:  8:25 AM  Video End Time (time video stopped): 9:18 AM    How would patient like to obtain AVS? MyChart    Assessment Type:   ASSESSMENT:  Julius is having problems with his monitor and lancing device, he has been only able to monitor blood sugar once since receiving kit.  He will call [employer] customer service to request new lancing device.  Review of diet shows that he has been following intermittent fasting 12/12 and reports 10 lbs weight loss over the past few weeks and has made a lot of changes to diet with healthier choices and portions.  He reports difficulty with any prolonged activity due to back issues but has started walking as much as he can stand.      Patient's most recent   Lab Results   Component Value Date    A1C 7.5 07/28/2023     is not meeting goal of <7.0    Diabetes knowledge and skills assessment:   Patient is knowledgeable in diabetes management concepts related to: Healthy Eating and Taking Medication    Continue education with the following diabetes management concepts: Being Active, Monitoring, Problem Solving, Reducing Risks, and Healthy Coping    Based on learning assessment above, most appropriate setting for further diabetes education would be: Individual setting.      PLAN  1) Consume 45-60 grams carbohydrate at  "each meal  2) Increase activity levels to walking > 30 minutes/day  3) Monitor once daily at varying times and have results for next appointment  4) Call [employer] customer service for lancing device replacement    Topics to cover at upcoming visits: Being Active, Monitoring, Problem Solving, Reducing Risks, and Healthy Coping    Follow-up: 9/15/23    See Care Plan for co-developed, patient-state behavior change goals.  AVS provided for patient today.    Education Materials Provided:  [facility] Healthy Living with Diabetes Book, Carbohydrate Counting, and My Plate Planner      SUBJECTIVE/OBJECTIVE:  Presents for: Initial Assessment for new diagnosis  Accompanied by: Self  Diabetes education in the past 24mo: No  Focus of Visit: Monitoring, Healthy Eating, Diabetes Pathophysiology  Diabetes type: Type 2  Date of diagnosis: 7/28/23  Difficulty affording diabetes medication?: Yes  Difficulty affording diabetes testing supplies?: No  Other concerns:: None  Cultural Influences/Ethnic Background:  Not  or       Diabetes Symptoms & Complications:  Fatigue: Yes  Neuropathy: No  Polydipsia: No  Polyphagia: No  Polyuria: No  Visual change: No  Slow healing wounds: No  Symptom course: Stable  Weight trend: Stable       Patient Problem List and Family Medical History reviewed for relevant medical history, current medical status, and diabetes risk factors.    Vitals:  There were no vitals taken for this visit.  Estimated body mass index is 51.88 kg/m  as calculated from the following:    Height as of 8/15/23: 1.803 m (5' 11\").    Weight as of 8/15/23: 168.7 kg (372 lb).   Last 3 BP:   BP Readings from Last 3 Encounters:   08/11/23 115/80   08/04/23 (!) 152/86   07/28/23 (!) 136/90       History   Smoking Status     Every Day     Packs/day: 0.25     Years: 26.00     Types: Cigarettes     Start date: 5/1/1996   Smokeless Tobacco     Never       Labs:  Lab Results   Component Value Date    A1C 7.5 07/28/2023 "     Lab Results   Component Value Date     07/28/2023     07/01/2022     Lab Results   Component Value Date     07/28/2023     Direct Measure HDL   Date Value Ref Range Status   07/28/2023 38 (L) >=40 mg/dL Final   ]  GFR Estimate   Date Value Ref Range Status   07/28/2023 >90 >60 mL/min/1.73m2 Final     No results found for: GFRESTBLACK  Lab Results   Component Value Date    CR 0.87 07/28/2023     No results found for: MICROALBUMIN    Healthy Eating:  Healthy Eating Assessed Today: Yes  Cultural/Restorationism diet restrictions?: No  Meal planning/habits:  (intermittent fasting)  Meals include: Breakfast, Lunch  Breakfast: 8:30 AM 2 eggs and plain yogurt or protein shake (premier) + 1-2 pcs. whole grain toast  Lunch: 2 PM: Chicken taco at [employer] or [employer]  Dinner: 6-7PM: Protein Shake or Venison Burger without bun onion, lindsey, cheese  and asparagus/green beans  Beverages: Tea, Water  Has patient met with a dietitian in the past?: No    Being Active:  Being Active Assessed Today: Yes  Exercise:: Currently not exercising    Monitoring:  Monitoring Assessed Today: Yes  Blood Glucose Meter: Accu-chek  Times checking blood sugar at home (number): 1  Times checking blood sugar at home (per): Day    Having problems with lancing device has only monitored once since receiving it, pre lunch 135 mg/dl.    Taking Medications:  Diabetes Medication(s)       Biguanides       metFORMIN (GLUCOPHAGE XR) 500 MG 24 hr tablet    Take 1 tablet (500 mg) by mouth 2 times daily (with meals) Take only once daily with evening meal for 1-2 weeks before increasing dose            Taking Medication Assessed Today: Yes  Current Treatments: Oral Medication (taken by mouth)  Diabetes medication side effects?: No    Problem Solving:  Problem Solving Assessed Today: Yes  Is the patient at risk for hypoglycemia?: No  Is the patient at risk for DKA?: No  Does patient have severe weather/disaster plan for diabetes management?:  No  Does patient have sick day plan for diabetes management?: No       Reducing Risks:  Reducing Risks Assessed Today: Yes  CAD Risks: Diabetes Mellitus, Hypertension, Male sex, Obesity  Has dilated eye exam at least once a year?: Yes  Sees dentist every 6 months?: No  Feet checked by healthcare provider in the last year?: Yes    Healthy Coping:  Healthy Coping Assessed Today: Yes  Emotional response to diabetes: Fear/Anxiety  Informal Support system:: Friends  Patient Activation Measure Survey Score:      1/16/2023     8:33 AM   PEPITO Score (Last Two)   PEPITO Raw Score 35   Activation Score 72.1   PEPITO Level 3         Care Plan and Education Provided:  Care Plan: Diabetes   Updates made by Babita Rojas RD since 8/18/2023 12:00 AM        Problem: HbA1C Not In Goal         Goal: Establish Regular Follow-Ups with PCP         Task: Discuss with PCP the recommended timing for patient's next follow up visit(s)    Responsible User: Babita Rojas RD        Task: Discuss schedule for PCP visits with patient    Responsible User: Babita Rojas RD        Goal: Get HbA1C Level in Goal         Task: Educate patient on diabetes education self-management topics    Responsible User: Babita Rojas RD        Task: Educate patient on benefits of regular glucose monitoring    Responsible User: Babita Rojas RD        Task: Refer patient to appropriate extended care team member, as needed (Medication Therapy Management, Behavioral Health, Physical Therapy, etc.)    Responsible User: Babita Rojas RD        Task: Discuss diabetes treatment plan with patient    Responsible User: Babita Rojas RD        Problem: Diabetes Self-Management Education Needed to Optimize Self-Care Behaviors         Goal: Understand diabetes pathophysiology and disease progression         Task: Provide education on diabetes pathophysiology and disease progression specfic to patient's diabetes type Completed 8/18/2023   Responsible  User: Babita Rojas RD        Goal: Healthy Eating - follow a healthy eating pattern for diabetes    This Visit's Progress: 50%   Note:    Consume 45-60 grams carbohydrate at each meal       Task: Provide education on managing carbohydrate intake (carbohydrate counting, plate planning method, etc.) Completed 8/18/2023   Responsible User: Babita Rojas RD        Task: Provide education on weight management Completed 8/18/2023   Responsible User: Babita Rojas RD        Task: Provide education on heart healthy eating    Responsible User: Babita Rojas RD        Task: Provide education on eating out    Responsible User: Babita Rojas RD        Task: Develop individualized healthy eating plan with patient    Responsible User: Babita Rojas RD        Goal: Being Active - get regular physical activity, working up to at least 150 minutes per week         Task: Provide education on relationship of activity to glucose and precautions to take if at risk for low glucose Completed 8/18/2023   Responsible User: Babita Rojas RD        Task: Discuss barriers to physical activity with patient Completed 8/18/2023   Responsible User: Babita Rojas RD        Task: Develop physical activity plan with patient    Responsible User: Babita Rojas RD        Task: Explore community resources including walking groups, assistance programs, and home videos    Responsible User: Babita Rojas RD        Goal: Monitoring - monitor glucose and ketones as directed         Task: Provide education on blood glucose monitoring (purpose, proper technique, frequency, glucose targets, interpreting results, when to use glucose control solution, sharps disposal) Completed 8/18/2023   Responsible User: Babita Rojas RD        Task: Provide education on continuous glucose monitoring (sensor placement, use of russ or /reader, understanding glucose trends, alerts and alarms, differences between sensor  glucose and blood glucose)    Responsible User: Babita Rojas RD        Task: Provide education on ketone monitoring (when to monitor, frequency, etc.)    Responsible User: Babita Rojas RD        Goal: Taking Medication - patient is consistently taking medications as directed         Task: Provide education on action of prescribed medication, including when to take and possible side effects Completed 8/18/2023   Responsible User: Babita Rojas RD        Task: Provide education on insulin and injectable diabetes medications, including administration, storage, site selection and rotation for injection sites    Responsible User: Babita Rojas RD        Task: Discuss barriers to medication adherence with patient and provide management technique ideas as appropriate    Responsible User: Babita Rojas RD        Task: Provide education on frequency and refill details of medications    Responsible User: Babita Rojas RD        Goal: Problem Solving - know how to prevent and manage short-term diabetes complications         Task: Provide education on high blood glucose - causes, signs/symptoms, prevention and treatment Completed 8/18/2023   Responsible User: Babita Rojas RD        Task: Provide education on low blood glucose - causes, signs/symptoms, prevention, treatment, carrying a carbohydrate source at all times, and medical identification    Responsible User: Babita Rojas RD        Task: Provide education on safe travel with diabetes    Responsible User: Babita Rojas RD        Task: Provide education on how to care for diabetes on sick days    Responsible User: Babita Rojas RD        Task: Provide education on when to call a health care provider    Responsible User: Babita Rojas RD        Goal: Reducing Risks - know how to prevent and treat long-term diabetes complications         Task: Provide education on major complications of diabetes, prevention, early  diagnostic measures and treatment of complications    Responsible User: Babita Rojas RD        Task: Provide education on recommended care for dental, eye and foot health    Responsible User: Babita Rojas RD        Task: Provide education on Hemoglobin A1c - goals and relationship to blood glucose levels    Responsible User: Babita Rojas RD        Task: Provide education on recommendations for heart health - lipid levels and goals, blood pressure and goals, and aspirin therapy, if indicated    Responsible User: Babita Rojas RD        Task: Provide education on tobacco cessation    Responsible User: Babita Rojas RD        Goal: Healthy Coping - use available resources to cope with the challenges of managing diabetes         Task: Discuss recognizing feelings about having diabetes Completed 8/18/2023   Responsible User: Babita Rojas RD        Task: Provide education on the benefits of making appropriate lifestyle changes    Responsible User: Babita Rojas RD        Task: Provide education on benefits of utilizing support systems    Responsible User: Babita Rojas RD        Task: Discuss methods for coping with stress    Responsible User: Babita Rojas RD        Task: Provide education on when to seek professional counseling    Responsible User: Babita Rojas RD Michelle DiDio, RD, LD, [facility]     Time Spent: 52 minutes  Encounter Type: Individual    Any diabetes medication dose changes were made via the CDE Protocol per the patient's referring provider. A copy of this encounter was shared with the provider.

## 2023-08-24 ENCOUNTER — ALLIED HEALTH/NURSE VISIT (OUTPATIENT)
Dept: FAMILY MEDICINE | Facility: CLINIC | Age: 43
End: 2023-08-24
Payer: COMMERCIAL

## 2023-08-24 VITALS — DIASTOLIC BLOOD PRESSURE: 84 MMHG | SYSTOLIC BLOOD PRESSURE: 129 MMHG

## 2023-08-24 DIAGNOSIS — I10 HYPERTENSION GOAL BP (BLOOD PRESSURE) < 130/80: ICD-10-CM

## 2023-08-24 DIAGNOSIS — Z01.30 BP CHECK: Primary | ICD-10-CM

## 2023-08-24 PROCEDURE — 99207 PR NO CHARGE NURSE ONLY: CPT | Performed by: NURSE PRACTITIONER

## 2023-08-24 NOTE — PROGRESS NOTES
Julius Zuniga was evaluated at [facility] on August 24, 2023 at which time his blood pressure was:    BP Readings from Last 3 Encounters:   08/24/23 129/84   08/11/23 115/80   08/04/23 (!) 152/86     Pulse Readings from Last 3 Encounters:   08/04/23 118   07/28/23 115   07/16/23 110       Reviewed lifestyle modifications for blood pressure control and reduction: including making healthy food choices, managing weight, getting regular exercise, smoking cessation, reducing alcohol consumption, monitoring blood pressure regularly.     Symptoms: None  .  BP Goal:Other: 130/80    BP Assessment:  BP too high    Potential Reasons for BP too high: NA - Not applicable    BP Follow-Up Plan: Referral to PCP    Recommendation to Provider: .    Note completed by: Lyn Arce [facility]   [phone]

## 2023-08-25 ENCOUNTER — ANCILLARY PROCEDURE (OUTPATIENT)
Dept: GENERAL RADIOLOGY | Facility: CLINIC | Age: 43
End: 2023-08-25
Attending: FAMILY MEDICINE
Payer: OTHER MISCELLANEOUS

## 2023-08-25 ENCOUNTER — OFFICE VISIT (OUTPATIENT)
Dept: ORTHOPEDICS | Facility: CLINIC | Age: 43
End: 2023-08-25
Payer: OTHER MISCELLANEOUS

## 2023-08-25 VITALS
WEIGHT: 315 LBS | HEIGHT: 71 IN | BODY MASS INDEX: 44.1 KG/M2 | DIASTOLIC BLOOD PRESSURE: 90 MMHG | SYSTOLIC BLOOD PRESSURE: 130 MMHG

## 2023-08-25 DIAGNOSIS — S52.571D OTHER CLOSED INTRA-ARTICULAR FRACTURE OF DISTAL END OF RIGHT RADIUS WITH ROUTINE HEALING, SUBSEQUENT ENCOUNTER: ICD-10-CM

## 2023-08-25 DIAGNOSIS — S52.571D OTHER CLOSED INTRA-ARTICULAR FRACTURE OF DISTAL END OF RIGHT RADIUS WITH ROUTINE HEALING, SUBSEQUENT ENCOUNTER: Primary | ICD-10-CM

## 2023-08-25 PROCEDURE — 99207 PR FRACTURE CARE IN GLOBAL PERIOD: CPT | Performed by: FAMILY MEDICINE

## 2023-08-25 PROCEDURE — 73110 X-RAY EXAM OF WRIST: CPT | Mod: RT | Performed by: FAMILY MEDICINE

## 2023-08-25 NOTE — LETTER
8/25/2023         RE: Julius Zuniga  [address] [facility] [address]        Dear Colleague,    Thank you for referring your patient, Julius Zuniga, to the [facility] [place]. Please see a copy of my visit note below.    ASSESSMENT & PLAN  Patient Instructions     1. Other closed intra-articular fracture of distal end of right radius with routine healing, subsequent encounter      -Patient is following up for acute right wrist pain due to a distal radial fracture  -X-rays taken office today show progressive healing of the intra-articular fracture  -Patient will start formal hand therapy and home exercise program to increase range of motion and strength  -Patient will return to work on 9/4/2023 without restrictions.  Patient will wear his brace for additional 2 to 3 weeks until pain improves  -We will follow-up on 9/30/2023 for reevaluation and progression of activity  -Call direct clinic number [[phone]] at any time with questions or concerns.    Albert Yeo MD [facility]  [facility] [place]        -----    SUBJECTIVE:  Julius Zuniga is a 43 year old male who is seen in follow-up for right wrist pain due to injury on 7/16/23 ~ 6 weeks ago. They were last seen 8/15/2023 and placed in a cock up wrist splint.     Since their last visit reports some improvement. He still reports pain with full wrist flexion and extension and supination. He also notes numbness along 5th finger into hand. They indicate that their current pain level is 4/10. They have tried rest/activity avoidance and casting/splinting/bracing.      The patient is seen by themselves.    Patient's past medical, surgical, social, and family histories were reviewed today and no changes are noted.    REVIEW OF SYSTEMS:  Constitutional: NEGATIVE for fever, chills, change in weight  Skin: NEGATIVE for worrisome rashes, moles or lesions  GI/: NEGATIVE for  "bowel or bladder changes  Neuro: NEGATIVE for weakness, dizziness or paresthesias    OBJECTIVE:  BP (!) 130/90   Ht 1.803 m (5' 11\")   Wt (!) 168.7 kg (372 lb)   BMI 51.88 kg/m     General: healthy, alert and in no distress  HEENT: no scleral icterus or conjunctival erythema  Skin: no suspicious lesions or rash. No jaundice.  CV: regular rhythm by palpation, no pedal edema  Resp: normal respiratory effort without conversational dyspnea   Psych: normal mood and affect  Gait: normal steady gait with appropriate coordination and balance  Neuro: normal light touch sensory exam of the extremities.    MSK:  RIGHT WRIST  Inspection:    No swelling or obvious deformity or asymmetry  Palpation:    Mildly tender about the distal radius and distal ulna. Remainder of bony and ligamentous line marks are nontender.     Metacarpals: normal    Thumb: normal    Fingers: normal  Range of Motion:    Extension and supination limited slightly due to stiffness and pain  Strength:  Limited in all directions due to pain. Normal pinch strength.  Special Tests:    Positive: None    Independent visualization of the below image:  Recent Results (from the past 24 hour(s))   XR Wrist Right G/E 3 Views    Narrative    Longitudinal nondisplaced distal radial fracture extending into the   radiocarpal joint demonstrates progressive callus formation compared to   previous x-ray performed on 8/15/2023.            Albert Yeo MD, [facility]        Again, thank you for allowing me to participate in the care of your patient.        Sincerely,        Albert Yeo, MD  "

## 2023-08-25 NOTE — PATIENT INSTRUCTIONS
1. Other closed intra-articular fracture of distal end of right radius with routine healing, subsequent encounter      -Patient is following up for acute right wrist pain due to a distal radial fracture  -X-rays taken office today show progressive healing of the intra-articular fracture  -Patient will start formal hand therapy and home exercise program to increase range of motion and strength  -Patient will return to work on 9/4/2023 without restrictions.  Patient will wear his brace for additional 2 to 3 weeks until pain improves  -We will follow-up on 9/30/2023 for reevaluation and progression of activity  -Call direct clinic number [[phone]] at any time with questions or concerns.    Albert Yeo MD [facility]  [facility] [place]

## 2023-08-25 NOTE — PROGRESS NOTES
"ASSESSMENT & PLAN  Patient Instructions     1. Other closed intra-articular fracture of distal end of right radius with routine healing, subsequent encounter      -Patient is following up for acute right wrist pain due to a distal radial fracture  -X-rays taken office today show progressive healing of the intra-articular fracture  -Patient will start formal hand therapy and home exercise program to increase range of motion and strength  -Patient will return to work on 9/4/2023 without restrictions.  Patient will wear his brace for additional 2 to 3 weeks until pain improves  -We will follow-up on 9/30/2023 for reevaluation and progression of activity  -Call direct clinic number [[phone]] at any time with questions or concerns.    Albert Yeo MD [facility] Medicine  [facility]        -----    SUBJECTIVE:  Julius Zuniga is a 43 year old male who is seen in follow-up for right wrist pain due to injury on 7/16/23 ~ 6 weeks ago. They were last seen 8/15/2023 and placed in a cock up wrist splint.     Since their last visit reports some improvement. He still reports pain with full wrist flexion and extension and supination. He also notes numbness along 5th finger into hand. They indicate that their current pain level is 4/10. They have tried rest/activity avoidance and casting/splinting/bracing.      The patient is seen by themselves.    Patient's past medical, surgical, social, and family histories were reviewed today and no changes are noted.    REVIEW OF SYSTEMS:  Constitutional: NEGATIVE for fever, chills, change in weight  Skin: NEGATIVE for worrisome rashes, moles or lesions  GI/: NEGATIVE for bowel or bladder changes  Neuro: NEGATIVE for weakness, dizziness or paresthesias    OBJECTIVE:  BP (!) 130/90   Ht 1.803 m (5' 11\")   Wt (!) 168.7 kg (372 lb)   BMI 51.88 kg/m     General: healthy, alert and in no distress  HEENT: no scleral icterus or conjunctival " erythema  Skin: no suspicious lesions or rash. No jaundice.  CV: regular rhythm by palpation, no pedal edema  Resp: normal respiratory effort without conversational dyspnea   Psych: normal mood and affect  Gait: normal steady gait with appropriate coordination and balance  Neuro: normal light touch sensory exam of the extremities.    MSK:  RIGHT WRIST  Inspection:    No swelling or obvious deformity or asymmetry  Palpation:    Mildly tender about the distal radius and distal ulna. Remainder of bony and ligamentous line marks are nontender.     Metacarpals: normal    Thumb: normal    Fingers: normal  Range of Motion:    Extension and supination limited slightly due to stiffness and pain  Strength:  Limited in all directions due to pain. Normal pinch strength.  Special Tests:    Positive: None    Independent visualization of the below image:  Recent Results (from the past 24 hour(s))   XR Wrist Right G/E 3 Views    Narrative    Longitudinal nondisplaced distal radial fracture extending into the   radiocarpal joint demonstrates progressive callus formation compared to   previous x-ray performed on 8/15/2023.            Albert Yeo MD, [facility]

## 2023-08-25 NOTE — LETTER
August 25, 2023      Julius Zuniga  [address] [place] [id number]        To Whom It May Concern:    Julius Zuniga was seen in our clinic. He may return to work without restrictions on 9/4/23.      Sincerely,        Albert Yeo, MD

## 2023-08-25 NOTE — PROGRESS NOTES
BP looks good close to goal.     HR remains elevated. Consider Beta blocker addition.       Healthy regards,            Jennifer Walker, FNP-BC

## 2023-09-26 RX ORDER — METOPROLOL SUCCINATE 25 MG/1
25 TABLET, EXTENDED RELEASE ORAL DAILY
Qty: 90 TABLET | Refills: 3 | Status: SHIPPED | OUTPATIENT
Start: 2023-09-26 | End: 2024-03-27

## 2023-09-26 NOTE — PROGRESS NOTES
BP continues to be elevated.     BP Readings from Last 6 Encounters:   08/25/23 (!) 130/90   08/24/23 129/84   08/11/23 115/80   08/04/23 (!) 152/86   07/28/23 (!) 136/90   07/17/23 (!) 140/90        Please call lets start metoprolol 25 mg daily.  Recheck BP in pharmacy in 2 weeks; [facility]           Healthy regards,            Jennifer Walker, FNP-BC

## 2023-09-28 NOTE — PROGRESS NOTES
Advised Pt of Metoprolol Rx and made appointment for BP recheck in 2 weeks. Pt was not happy about changes and writer explained that often times BP meds need adjustment because of the different ways they work. Pt states his BP is elevated because of all these changes. Explained that Metoprolol with help with BP and also HR which has also been elevated. Offered Pt virtual appointment to discuss concerns with provider and patient stated he would not spend anymore money on appointments.     Mary Pantoja RN [facility]

## 2023-09-30 ENCOUNTER — OFFICE VISIT (OUTPATIENT)
Dept: ORTHOPEDICS | Facility: CLINIC | Age: 43
End: 2023-09-30
Payer: OTHER MISCELLANEOUS

## 2023-09-30 ENCOUNTER — ANCILLARY PROCEDURE (OUTPATIENT)
Dept: GENERAL RADIOLOGY | Facility: CLINIC | Age: 43
End: 2023-09-30
Attending: FAMILY MEDICINE
Payer: OTHER MISCELLANEOUS

## 2023-09-30 VITALS
HEIGHT: 71 IN | BODY MASS INDEX: 44.1 KG/M2 | WEIGHT: 315 LBS | SYSTOLIC BLOOD PRESSURE: 140 MMHG | DIASTOLIC BLOOD PRESSURE: 88 MMHG

## 2023-09-30 DIAGNOSIS — S69.81XA INJURY OF TRIANGULAR FIBROCARTILAGE COMPLEX (TFCC) OF RIGHT WRIST, INITIAL ENCOUNTER: ICD-10-CM

## 2023-09-30 DIAGNOSIS — G56.01 CARPAL TUNNEL SYNDROME OF RIGHT WRIST: ICD-10-CM

## 2023-09-30 DIAGNOSIS — S52.571D OTHER CLOSED INTRA-ARTICULAR FRACTURE OF DISTAL END OF RIGHT RADIUS WITH ROUTINE HEALING, SUBSEQUENT ENCOUNTER: ICD-10-CM

## 2023-09-30 DIAGNOSIS — S52.571D OTHER CLOSED INTRA-ARTICULAR FRACTURE OF DISTAL END OF RIGHT RADIUS WITH ROUTINE HEALING, SUBSEQUENT ENCOUNTER: Primary | ICD-10-CM

## 2023-09-30 PROCEDURE — 99214 OFFICE O/P EST MOD 30 MIN: CPT | Performed by: FAMILY MEDICINE

## 2023-09-30 PROCEDURE — 73110 X-RAY EXAM OF WRIST: CPT | Mod: RT | Performed by: FAMILY MEDICINE
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
## 2023-10-05 ENCOUNTER — TELEPHONE (OUTPATIENT)
Dept: FAMILY MEDICINE | Facility: CLINIC | Age: 43
End: 2023-10-05
Payer: COMMERCIAL

## 2023-10-05 NOTE — TELEPHONE ENCOUNTER
Patient calling to follow up on metoprolol - he states pharmacy does not have it.     etoprolol succinate ER (TOPROL XL) 25 MG 24 hr tablet 90 tablet 3 9/26/2023  [id number] - [facility] [address] [facility] [address]     Called pharmacy , pharmacist states  90 tablets 9/29      Called patient back and informed him up 9/29 .   Advised to check his bottles and call back if there is an issue.     Advised of refills

## 2023-10-13 ENCOUNTER — ALLIED HEALTH/NURSE VISIT (OUTPATIENT)
Dept: FAMILY MEDICINE | Facility: CLINIC | Age: 43
End: 2023-10-13
Payer: COMMERCIAL

## 2023-10-13 VITALS — DIASTOLIC BLOOD PRESSURE: 82 MMHG | SYSTOLIC BLOOD PRESSURE: 122 MMHG

## 2023-10-13 DIAGNOSIS — I10 HYPERTENSION GOAL BP (BLOOD PRESSURE) < 130/80: Primary | ICD-10-CM

## 2023-10-13 PROCEDURE — 99207 PR NO CHARGE NURSE ONLY: CPT | Performed by: NURSE PRACTITIONER

## 2023-10-13 NOTE — Clinical Note
Pt has appt scheduled for 11/6.  He is tolerating meds fine, seemed frustrated to be in pharmacy/clinic, but was much more interactive by the end of the visit.

## 2023-10-14 NOTE — PROGRESS NOTES
Julius Zuniga was evaluated at [facility] on October 14, 2023 at which time his blood pressure was:    BP Readings from Last 1 Encounters:   10/13/23 122/82     No data recorded      Reviewed lifestyle modifications for blood pressure control and reduction: including making healthy food choices, managing weight, getting regular exercise, smoking cessation, reducing alcohol consumption, monitoring blood pressure regularly.     Symptoms: None    BP Goal:Other: <130/80    BP Assessment:  BP too high    Potential Reasons for BP too high: Tobacco use within past 30 minutes    BP Follow-Up Plan: Recheck BP in 30 days at pharmacy    Recommendation to Provider: Pt has yearly office visit scheduled 11/6.  He did report that he used cigarettes very shortly before his blood pressure check.  He expressed frustration that he had to take off from his [employer] job to come to have blood pressure checked.  We talked about his medications, and that his blood pressure is VERY close to goal.  I suggested that we do a blood pressure check when has not used nicotine and he agreed that he could do that for his office visit (then would only need to check bp every 6 months once at goal).  At the end of the visit, the writer contacted his pharmacy ([facility]) to get refills ready of his diabetic supplies and 2 other medications.  I also followed up with a phone call to pt the next day (10/14/23) to verify that he did  meds which he confirmed.    Note completed by: Jennifer Bishop [facility]

## 2023-11-06 ENCOUNTER — OFFICE VISIT (OUTPATIENT)
Dept: FAMILY MEDICINE | Facility: CLINIC | Age: 43
End: 2023-11-06
Payer: COMMERCIAL

## 2023-11-06 VITALS
HEART RATE: 111 BPM | SYSTOLIC BLOOD PRESSURE: 128 MMHG | HEIGHT: 71 IN | DIASTOLIC BLOOD PRESSURE: 88 MMHG | OXYGEN SATURATION: 96 % | WEIGHT: 315 LBS | BODY MASS INDEX: 44.1 KG/M2 | TEMPERATURE: 97 F | RESPIRATION RATE: 18 BRPM

## 2023-11-06 DIAGNOSIS — E11.65 TYPE 2 DIABETES MELLITUS WITH HYPERGLYCEMIA, WITHOUT LONG-TERM CURRENT USE OF INSULIN (H): Primary | ICD-10-CM

## 2023-11-06 DIAGNOSIS — I10 HYPERTENSION GOAL BP (BLOOD PRESSURE) < 130/80: ICD-10-CM

## 2023-11-06 LAB
ANION GAP SERPL CALCULATED.3IONS-SCNC: 12 MMOL/L (ref 7–15)
BUN SERPL-MCNC: 12.1 MG/DL (ref 6–20)
CALCIUM SERPL-MCNC: 9.6 MG/DL (ref 8.6–10)
CHLORIDE SERPL-SCNC: 103 MMOL/L (ref 98–107)
CREAT SERPL-MCNC: 0.77 MG/DL (ref 0.67–1.17)
CREAT UR-MCNC: 150 MG/DL
DEPRECATED HCO3 PLAS-SCNC: 25 MMOL/L (ref 22–29)
EGFRCR SERPLBLD CKD-EPI 2021: >90 ML/MIN/1.73M2
GLUCOSE SERPL-MCNC: 95 MG/DL (ref 70–99)
HBA1C MFR BLD: 6.2 % (ref 0–5.6)
MICROALBUMIN UR-MCNC: <12 MG/L
MICROALBUMIN/CREAT UR: NORMAL MG/G{CREAT}
POTASSIUM SERPL-SCNC: 4.3 MMOL/L (ref 3.4–5.3)
SODIUM SERPL-SCNC: 140 MMOL/L (ref 135–145)

## 2023-11-06 PROCEDURE — 36415 COLL VENOUS BLD VENIPUNCTURE: CPT | Performed by: NURSE PRACTITIONER

## 2023-11-06 PROCEDURE — 83036 HEMOGLOBIN GLYCOSYLATED A1C: CPT | Performed by: NURSE PRACTITIONER

## 2023-11-06 PROCEDURE — 80048 BASIC METABOLIC PNL TOTAL CA: CPT | Performed by: NURSE PRACTITIONER

## 2023-11-06 PROCEDURE — 99214 OFFICE O/P EST MOD 30 MIN: CPT | Performed by: NURSE PRACTITIONER

## 2023-11-06 PROCEDURE — 82043 UR ALBUMIN QUANTITATIVE: CPT | Performed by: NURSE PRACTITIONER

## 2023-11-06 PROCEDURE — 82570 ASSAY OF URINE CREATININE: CPT | Performed by: NURSE PRACTITIONER

## 2023-11-06 RX ORDER — METFORMIN HCL 500 MG
1000 TABLET, EXTENDED RELEASE 24 HR ORAL 2 TIMES DAILY WITH MEALS
Qty: 360 TABLET | Refills: 3 | Status: SHIPPED | OUTPATIENT
Start: 2023-11-06 | End: 2024-02-04

## 2023-11-06 NOTE — PROGRESS NOTES
Assessment & Plan     Type 2 diabetes mellitus with hyperglycemia, without long-term current use of insulin (H)  Tremendous improvement of blood pressure and blood sugars with a 40 pound weight loss.    Commended on his efforts.    He is working very hard to improve his overall health and decrease his A1c and blood pressure.    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    He will need a comprehensive diabetic eye exam to ensure no diabetic retinopathy as well.    Follow-up labs in 3 months.  Recommend low-dose statin medication as he is a diabetic and high blood pressure.  He declines today.  Will continue to recommend this in the future.  Recommend vaccines including tetanus which he is overdue on and he declines today.  Julius verbalizes understanding of plan of care and is in agreement.      - HEMOGLOBIN A1C  - Albumin Random Urine Quantitative with Creat Ratio  - metFORMIN (GLUCOPHAGE XR) 500 MG 24 hr tablet  Dispense: 360 tablet; Refill: 3  - Basic metabolic panel  (Ca, Cl, CO2, Creat, Gluc, K, Na, BUN)  - HEMOGLOBIN A1C  - Albumin Random Urine Quantitative with Creat Ratio  - Basic metabolic panel  (Ca, Cl, CO2, Creat, Gluc, K, Na, BUN)    BMI 45.0-49.9, adult (H)    - Basic metabolic panel  (Ca, Cl, CO2, Creat, Gluc, K, Na, BUN)  - Basic metabolic panel  (Ca, Cl, CO2, Creat, Gluc, K, Na, BUN)    Hypertension goal BP (blood pressure) < 130/80    - Basic metabolic panel  (Ca, Cl, CO2, Creat, Gluc, K, Na, BUN)  - Basic metabolic panel  (Ca, Cl, CO2, Creat, Gluc, K, Na, BUN)       Return in 4 weeks (on 12/4/2023) for pharmacy BP check.      KRISTIN Mayes [facility]   Julius is a 43 year old, presenting for the following health issues:  Recheck Medication        11/6/2023     7:08 AM   Additional Questions   Roomed by Lazara       History of Present Illness       Reason for visit:  Follow up    He eats 2-3 servings of fruits and vegetables daily.He  consumes 0 sweetened beverage(s) daily.He exercises with enough effort to increase his heart rate 20 to 29 minutes per day.  He exercises with enough effort to increase his heart rate 4 days per week.   He is taking medications regularly.         Diabetes Follow-up    How often are you checking your blood sugar? Every day  What concerns do you have today about your diabetes? None   Do you have any of these symptoms? (Select all that apply)  No numbness or tingling in feet.  No redness, sores or blisters on feet.  No complaints of excessive thirst.  No reports of blurry vision.  No significant changes to weight.  Have you had a diabetic eye exam in the last 12 months? No        BP Readings from Last 2 Encounters:   11/06/23 128/88   10/13/23 122/82     Hemoglobin A1C (%)   Date Value   11/06/2023 6.2 (H)   07/28/2023 7.5 (H)     LDL Cholesterol Calculated (mg/dL)   Date Value   07/28/2023 122 (H)         Wt Readings from Last 5 Encounters:   11/06/23 (!) 152.9 kg (337 lb)   09/30/23 (!) 155.1 kg (342 lb)   08/25/23 (!) 168.7 kg (372 lb)   08/15/23 (!) 168.7 kg (372 lb)   08/04/23 (!) 168.7 kg (372 lb)      Has been working extremely hard to get his blood sugars and blood pressure down.  Has worked on what he is eating.  He has stopped all soda pop. He has lost almost 40 pounds since August.  With this weight loss sugars have significantly improved.  Is currently taking metformin 500 mg twice daily.  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    Declines vaccines today.  Has not had an eye exam.      Hypertension Follow-up    Do you check your blood pressure regularly outside of the clinic? No   Are you following a low salt diet? Yes  Are your blood pressures ever more than 140 on the top number (systolic) OR more   than 90 on the bottom number (diastolic), for example 140/90? Does not check      Review of Systems  "  Constitutional, HEENT, cardiovascular, pulmonary, GI, , musculoskeletal, neuro, skin, endocrine and psych systems are negative, except as otherwise noted in the HPI.       Objective    /88   Pulse 111   Temp 97  F (36.1  C) (Tympanic)   Resp 18   Ht 1.803 m (5' 11\")   Wt (!) 152.9 kg (337 lb)   SpO2 96%   BMI 47.00 kg/m    Body mass index is 47 kg/m .  Physical Exam   GENERAL: healthy, alert and no distress  RESP: lungs clear to auscultation - no rales, rhonchi or wheezes  CV: regular rate and rhythm, normal S1 S2, no S3 or S4, no murmur, click or rub, no peripheral edema and peripheral pulses strong  PSYCH: mentation appears normal, affect normal/bright    Results for orders placed or performed in visit on 11/06/23   HEMOGLOBIN A1C     Status: Abnormal   Result Value Ref Range    Hemoglobin A1C 6.2 (H) 0.0 - 5.6 %            "

## 2023-11-06 NOTE — RESULT ENCOUNTER NOTE
Dear Julius,    Here is a summary of your recent test results:    WOW! Great job.   You have dropped your A1C down to 6.2 (prediabetic range). Continue with your great efforts this is excellent news.   Try to take the Metformin 1000 mg twice daily as tolerated.     I will need you to complete a comprehensive diabetic eye exam to make sure your eyes are not affected from your blood pressure or diabetes.   Your DOT provider may need a copy of this report also to clear you.   I sent this referral today and it looks like it was declined.   Please call the number below to set up whatever location works for you for this eye exam.   [facility] will call you to coordinate your care as prescribed by your provider. If you don't hear from a representative within 2 business days, please call [phone].    For additional lab test information, [email or web address] is an excellent reference.    In addition, here is a list of due or overdue Health Maintenance reminders:      Eye Exam Never done  Diptheria Tetanus Pertussis (DTAP/TDAP/TD) Vaccine(3 - Td or Tdap) due on 11/08/2019    Please call us at [phone] (or use [employer]) to address the above recommendations if needed.    Thank you for choosing  [facility] [place].  It was an honor and a privilege to participate in your care.       Healthy regards,    Jennifer Walker, LOYD  [facility]

## 2023-11-10 NOTE — RESULT ENCOUNTER NOTE
Dear Julius,    Here is a summary of your recent test results:    -Microalbumin (urine protein) test is normal.  ADVISE: rechecking this annually.    For additional lab test information, [email or web address] is an excellent reference.    In addition, here is a list of due or overdue Health Maintenance reminders:    Discuss Advance Care Planning Never done  Eye Exam Never done  Diptheria Tetanus Pertussis (DTAP/TDAP/TD) Vaccine(3 - Td or Tdap) due on 11/08/2019    Please call us at [phone] (or use Remote) to address the above recommendations if needed.    Thank you for choosing [facility].  It was an honor and a privilege to participate in your care.       Healthy regards,    Jennifer Walker, LOYD  [facility]

## 2023-12-18 ENCOUNTER — TELEPHONE (OUTPATIENT)
Dept: ORTHOPEDICS | Facility: CLINIC | Age: 43
End: 2023-12-18
Payer: COMMERCIAL

## 2023-12-18 NOTE — TELEPHONE ENCOUNTER
Reason for Call:  Form, our goal is to have forms completed with 7 days, however, some forms may require a visit or additional information.    Type of letter, form or note:  work comp     Who is the form from?:  [employer]    Where did the form come from: form was faxed in    Phone number of person requesting form: [phone]  Can we leave a detailed message on this number:  YES    Desired completion date of form: asap      How will form be returned?:  fax to Paulino Lozano at [phone]    Has the patient signed a consent form for release of information (may be included with form)? Not Applicable    Form was started and place in Provider Basket for provider review/ completion at [facility].     Alisha Sotelo MBA, ATC

## 2024-02-17 ENCOUNTER — HEALTH MAINTENANCE LETTER (OUTPATIENT)
Age: 44
End: 2024-02-17

## 2024-03-27 ENCOUNTER — MYC REFILL (OUTPATIENT)
Dept: FAMILY MEDICINE | Facility: CLINIC | Age: 44
End: 2024-03-27
Payer: COMMERCIAL

## 2024-03-27 DIAGNOSIS — R06.2 WHEEZING: ICD-10-CM

## 2024-03-27 DIAGNOSIS — I10 HYPERTENSION GOAL BP (BLOOD PRESSURE) < 130/80: ICD-10-CM

## 2024-03-28 RX ORDER — ALBUTEROL SULFATE 90 UG/1
2 AEROSOL, METERED RESPIRATORY (INHALATION) EVERY 4 HOURS PRN
Qty: 18 G | Refills: 0 | Status: SHIPPED | OUTPATIENT
Start: 2024-03-28

## 2024-03-28 RX ORDER — METOPROLOL SUCCINATE 25 MG/1
25 TABLET, EXTENDED RELEASE ORAL DAILY
Qty: 90 TABLET | Refills: 1 | Status: SHIPPED | OUTPATIENT
Start: 2024-03-28

## 2024-03-28 RX ORDER — HYDROCHLOROTHIAZIDE 25 MG/1
25 TABLET ORAL DAILY
Qty: 90 TABLET | Refills: 1 | Status: SHIPPED | OUTPATIENT
Start: 2024-03-28 | End: 2024-09-23

## 2024-03-28 RX ORDER — LISINOPRIL 20 MG/1
20 TABLET ORAL 2 TIMES DAILY
Qty: 180 TABLET | Refills: 1 | Status: SHIPPED | OUTPATIENT
Start: 2024-03-28 | End: 2024-09-23

## 2024-06-30 ENCOUNTER — HEALTH MAINTENANCE LETTER (OUTPATIENT)
Age: 44
End: 2024-06-30

## 2024-09-08 ENCOUNTER — HEALTH MAINTENANCE LETTER (OUTPATIENT)
Age: 44
End: 2024-09-08

## 2024-09-23 DIAGNOSIS — I10 HYPERTENSION GOAL BP (BLOOD PRESSURE) < 130/80: ICD-10-CM

## 2024-09-23 RX ORDER — HYDROCHLOROTHIAZIDE 25 MG/1
25 TABLET ORAL DAILY
Qty: 90 TABLET | Refills: 0 | Status: SHIPPED | OUTPATIENT
Start: 2024-09-23

## 2024-09-23 RX ORDER — LISINOPRIL 20 MG/1
20 TABLET ORAL 2 TIMES DAILY
Qty: 180 TABLET | Refills: 0 | Status: SHIPPED | OUTPATIENT
Start: 2024-09-23

## 2024-10-05 ENCOUNTER — ALLIED HEALTH/NURSE VISIT (OUTPATIENT)
Dept: FAMILY MEDICINE | Facility: CLINIC | Age: 44
End: 2024-10-05
Payer: COMMERCIAL

## 2024-10-05 VITALS — DIASTOLIC BLOOD PRESSURE: 87 MMHG | SYSTOLIC BLOOD PRESSURE: 131 MMHG | HEART RATE: 94 BPM

## 2024-10-05 DIAGNOSIS — I10 HYPERTENSION GOAL BP (BLOOD PRESSURE) < 130/80: Primary | ICD-10-CM

## 2024-10-05 PROCEDURE — 99207 PR NO CHARGE NURSE ONLY: CPT | Performed by: NURSE PRACTITIONER

## 2024-10-05 NOTE — PROGRESS NOTES
Julius Zuniga was evaluated at [facility] on October 5, 2024 at which time his blood pressure was:    BP Readings from Last 1 Encounters:   10/05/24 131/87     No data recorded      Reviewed lifestyle modifications for blood pressure control and reduction: including making healthy food choices, managing weight, getting regular exercise, smoking cessation, reducing alcohol consumption, monitoring blood pressure regularly.     Symptoms: None    BP Goal:Other: <130/80    BP Assessment:  BP too high    Potential Reasons for BP too high: Dose of BP medication too low    BP Follow-Up Plan: Referral to PCP    Recommendation to Provider: Andrey may benefit from increased dose metoprolol (from 25mg to 50mg daily) to help lower his HR and bring BP to goal.    Jennifer Peng, PharmTATYANA  [facility]  PH: [phone]      Note completed by: Jennifer Peng [facility]

## 2024-11-05 RX ORDER — METOPROLOL SUCCINATE 50 MG/1
50 TABLET, EXTENDED RELEASE ORAL DAILY
Qty: 90 TABLET | Refills: 4 | Status: SHIPPED | OUTPATIENT
Start: 2024-11-05

## 2024-11-05 NOTE — PROGRESS NOTES
Called Pt, advised of below. Pt reports understanding and agrees with plan.     Mary Pantoja RN [facility]

## 2024-11-05 NOTE — PROGRESS NOTES
Please call patient BP and heart rate still up some. Please have him increase his metoprolol to 50 mg daily. I have sent a new prescription but he can take two tablets of his current 25 mg dose until he picks up his new script.     Follow up BP in pharmacy 2 weeks after his dose change.       Healthy regards,            Jennifer Walker, FNP-BC

## 2024-11-17 ENCOUNTER — HEALTH MAINTENANCE LETTER (OUTPATIENT)
Age: 44
End: 2024-11-17

## 2024-11-22 ENCOUNTER — ANCILLARY PROCEDURE (OUTPATIENT)
Dept: GENERAL RADIOLOGY | Facility: CLINIC | Age: 44
End: 2024-11-22
Attending: PHYSICIAN ASSISTANT
Payer: COMMERCIAL

## 2024-11-22 DIAGNOSIS — M25.552 HIP PAIN, LEFT: ICD-10-CM

## 2024-11-22 PROCEDURE — 73502 X-RAY EXAM HIP UNI 2-3 VIEWS: CPT | Mod: TC | Performed by: INTERNAL MEDICINE

## 2024-12-02 ENCOUNTER — MYC REFILL (OUTPATIENT)
Dept: FAMILY MEDICINE | Facility: CLINIC | Age: 44
End: 2024-12-02
Payer: COMMERCIAL

## 2024-12-02 DIAGNOSIS — I10 HYPERTENSION GOAL BP (BLOOD PRESSURE) < 130/80: ICD-10-CM

## 2024-12-02 DIAGNOSIS — E11.65 TYPE 2 DIABETES MELLITUS WITH HYPERGLYCEMIA, WITHOUT LONG-TERM CURRENT USE OF INSULIN (H): ICD-10-CM

## 2024-12-02 RX ORDER — METFORMIN HYDROCHLORIDE 500 MG/1
1000 TABLET, EXTENDED RELEASE ORAL 2 TIMES DAILY WITH MEALS
Qty: 360 TABLET | Refills: 0 | Status: SHIPPED | OUTPATIENT
Start: 2024-12-02

## 2024-12-02 RX ORDER — METOPROLOL SUCCINATE 50 MG/1
50 TABLET, EXTENDED RELEASE ORAL DAILY
Qty: 90 TABLET | Refills: 2 | Status: SHIPPED | OUTPATIENT
Start: 2024-12-02

## 2025-03-08 ENCOUNTER — HEALTH MAINTENANCE LETTER (OUTPATIENT)
Age: 45
End: 2025-03-08

## 2025-06-22 ENCOUNTER — HEALTH MAINTENANCE LETTER (OUTPATIENT)
Age: 45
End: 2025-06-22